# Patient Record
Sex: MALE | Race: WHITE | NOT HISPANIC OR LATINO | Employment: FULL TIME | URBAN - METROPOLITAN AREA
[De-identification: names, ages, dates, MRNs, and addresses within clinical notes are randomized per-mention and may not be internally consistent; named-entity substitution may affect disease eponyms.]

---

## 2021-06-01 ENCOUNTER — HOSPITAL ENCOUNTER (INPATIENT)
Facility: CLINIC | Age: 21
LOS: 2 days | Discharge: HOME | DRG: 897 | End: 2021-06-03
Attending: EMERGENCY MEDICINE | Admitting: INTERNAL MEDICINE
Payer: COMMERCIAL

## 2021-06-01 DIAGNOSIS — S06.9X0A TRAUMATIC BRAIN INJURY, WITHOUT LOSS OF CONSCIOUSNESS, INITIAL ENCOUNTER (H): Primary | ICD-10-CM

## 2021-06-01 DIAGNOSIS — F10.930 ALCOHOL WITHDRAWAL, UNCOMPLICATED (H): ICD-10-CM

## 2021-06-01 DIAGNOSIS — E87.29 ALCOHOLIC KETOACIDOSIS: ICD-10-CM

## 2021-06-01 PROBLEM — R56.9 ALCOHOL WITHDRAWAL SEIZURE (H): Status: ACTIVE | Noted: 2021-06-01

## 2021-06-01 PROBLEM — F10.939 ALCOHOL WITHDRAWAL SEIZURE (H): Status: ACTIVE | Noted: 2021-06-01

## 2021-06-01 LAB
ALBUMIN SERPL-MCNC: 4.2 G/DL (ref 3.4–5)
ALBUMIN UR-MCNC: 70 MG/DL
ALP SERPL-CCNC: 88 U/L (ref 40–150)
ALT SERPL W P-5'-P-CCNC: 49 U/L (ref 0–70)
AMPHETAMINES UR QL SCN: NEGATIVE
ANION GAP SERPL CALCULATED.3IONS-SCNC: 18 MMOL/L (ref 3–14)
APPEARANCE UR: ABNORMAL
AST SERPL W P-5'-P-CCNC: 42 U/L (ref 0–45)
BACTERIA #/AREA URNS HPF: ABNORMAL /HPF
BARBITURATES UR QL: NEGATIVE
BASOPHILS # BLD AUTO: 0 10E9/L (ref 0–0.2)
BASOPHILS NFR BLD AUTO: 0.3 %
BENZODIAZ UR QL: POSITIVE
BILIRUB SERPL-MCNC: 1.4 MG/DL (ref 0.2–1.3)
BILIRUB UR QL STRIP: NEGATIVE
BUN SERPL-MCNC: 17 MG/DL (ref 7–30)
CALCIUM SERPL-MCNC: 9.8 MG/DL (ref 8.5–10.1)
CANNABINOIDS UR QL SCN: POSITIVE
CHLORIDE SERPL-SCNC: 101 MMOL/L (ref 94–109)
CO2 SERPL-SCNC: 16 MMOL/L (ref 20–32)
COCAINE UR QL: NEGATIVE
COLOR UR AUTO: YELLOW
CREAT SERPL-MCNC: 1.38 MG/DL (ref 0.66–1.25)
DIFFERENTIAL METHOD BLD: ABNORMAL
EOSINOPHIL # BLD AUTO: 0 10E9/L (ref 0–0.7)
EOSINOPHIL NFR BLD AUTO: 0.1 %
ERYTHROCYTE [DISTWIDTH] IN BLOOD BY AUTOMATED COUNT: 13.5 % (ref 10–15)
ETHANOL SERPL-MCNC: <0.01 G/DL
GFR SERPL CREATININE-BSD FRML MDRD: 72 ML/MIN/{1.73_M2}
GLUCOSE SERPL-MCNC: 134 MG/DL (ref 70–99)
GLUCOSE UR STRIP-MCNC: NEGATIVE MG/DL
HCT VFR BLD AUTO: 48.3 % (ref 40–53)
HGB BLD-MCNC: 16.1 G/DL (ref 13.3–17.7)
HGB UR QL STRIP: NEGATIVE
HYALINE CASTS #/AREA URNS LPF: 16 /LPF (ref 0–2)
IMM GRANULOCYTES # BLD: 0 10E9/L (ref 0–0.4)
IMM GRANULOCYTES NFR BLD: 0.5 %
INTERPRETATION ECG - MUSE: NORMAL
KETONES BLD-SCNC: 1.7 MMOL/L (ref 0–0.6)
KETONES UR STRIP-MCNC: 20 MG/DL
LABORATORY COMMENT REPORT: NORMAL
LEUKOCYTE ESTERASE UR QL STRIP: NEGATIVE
LIPASE SERPL-CCNC: 70 U/L (ref 73–393)
LYMPHOCYTES # BLD AUTO: 0.7 10E9/L (ref 0.8–5.3)
LYMPHOCYTES NFR BLD AUTO: 7.4 %
MAGNESIUM SERPL-MCNC: 1.8 MG/DL (ref 1.6–2.3)
MAGNESIUM SERPL-MCNC: 1.9 MG/DL (ref 1.6–2.3)
MCH RBC QN AUTO: 31.7 PG (ref 26.5–33)
MCHC RBC AUTO-ENTMCNC: 33.3 G/DL (ref 31.5–36.5)
MCV RBC AUTO: 95 FL (ref 78–100)
MONOCYTES # BLD AUTO: 0.4 10E9/L (ref 0–1.3)
MONOCYTES NFR BLD AUTO: 5 %
MUCOUS THREADS #/AREA URNS LPF: PRESENT /LPF
NEUTROPHILS # BLD AUTO: 7.6 10E9/L (ref 1.6–8.3)
NEUTROPHILS NFR BLD AUTO: 86.7 %
NITRATE UR QL: NEGATIVE
NRBC # BLD AUTO: 0 10*3/UL
NRBC BLD AUTO-RTO: 0 /100
OPIATES UR QL SCN: NEGATIVE
PCP UR QL SCN: NEGATIVE
PH UR STRIP: 6.5 PH (ref 5–7)
PHOSPHATE SERPL-MCNC: 2.5 MG/DL (ref 2.5–4.5)
PLATELET # BLD AUTO: 261 10E9/L (ref 150–450)
POTASSIUM SERPL-SCNC: 3.5 MMOL/L (ref 3.4–5.3)
POTASSIUM SERPL-SCNC: 3.9 MMOL/L (ref 3.4–5.3)
PROT SERPL-MCNC: 8.5 G/DL (ref 6.8–8.8)
RBC # BLD AUTO: 5.08 10E12/L (ref 4.4–5.9)
RBC #/AREA URNS AUTO: 2 /HPF (ref 0–2)
SARS-COV-2 RNA RESP QL NAA+PROBE: NEGATIVE
SODIUM SERPL-SCNC: 135 MMOL/L (ref 133–144)
SOURCE: ABNORMAL
SP GR UR STRIP: 1.02 (ref 1–1.03)
SPECIMEN SOURCE: NORMAL
SQUAMOUS #/AREA URNS AUTO: 1 /HPF (ref 0–1)
UROBILINOGEN UR STRIP-MCNC: 0 MG/DL (ref 0–2)
WBC # BLD AUTO: 8.8 10E9/L (ref 4–11)
WBC #/AREA URNS AUTO: 3 /HPF (ref 0–5)

## 2021-06-01 PROCEDURE — 250N000011 HC RX IP 250 OP 636: Performed by: EMERGENCY MEDICINE

## 2021-06-01 PROCEDURE — 83735 ASSAY OF MAGNESIUM: CPT | Performed by: INTERNAL MEDICINE

## 2021-06-01 PROCEDURE — 258N000003 HC RX IP 258 OP 636: Performed by: EMERGENCY MEDICINE

## 2021-06-01 PROCEDURE — 80053 COMPREHEN METABOLIC PANEL: CPT | Performed by: EMERGENCY MEDICINE

## 2021-06-01 PROCEDURE — 82077 ASSAY SPEC XCP UR&BREATH IA: CPT | Performed by: EMERGENCY MEDICINE

## 2021-06-01 PROCEDURE — 83735 ASSAY OF MAGNESIUM: CPT | Performed by: EMERGENCY MEDICINE

## 2021-06-01 PROCEDURE — 250N000013 HC RX MED GY IP 250 OP 250 PS 637: Performed by: INTERNAL MEDICINE

## 2021-06-01 PROCEDURE — 99291 CRITICAL CARE FIRST HOUR: CPT | Mod: 25

## 2021-06-01 PROCEDURE — 81001 URINALYSIS AUTO W/SCOPE: CPT | Performed by: EMERGENCY MEDICINE

## 2021-06-01 PROCEDURE — 96361 HYDRATE IV INFUSION ADD-ON: CPT

## 2021-06-01 PROCEDURE — 96365 THER/PROPH/DIAG IV INF INIT: CPT

## 2021-06-01 PROCEDURE — 258N000001 HC RX 258: Performed by: INTERNAL MEDICINE

## 2021-06-01 PROCEDURE — 80307 DRUG TEST PRSMV CHEM ANLYZR: CPT | Performed by: EMERGENCY MEDICINE

## 2021-06-01 PROCEDURE — 84132 ASSAY OF SERUM POTASSIUM: CPT | Performed by: INTERNAL MEDICINE

## 2021-06-01 PROCEDURE — 120N000001 HC R&B MED SURG/OB

## 2021-06-01 PROCEDURE — 85025 COMPLETE CBC W/AUTO DIFF WBC: CPT | Performed by: EMERGENCY MEDICINE

## 2021-06-01 PROCEDURE — 83690 ASSAY OF LIPASE: CPT | Performed by: EMERGENCY MEDICINE

## 2021-06-01 PROCEDURE — 96375 TX/PRO/DX INJ NEW DRUG ADDON: CPT

## 2021-06-01 PROCEDURE — 84100 ASSAY OF PHOSPHORUS: CPT | Performed by: INTERNAL MEDICINE

## 2021-06-01 PROCEDURE — 99223 1ST HOSP IP/OBS HIGH 75: CPT | Mod: AI | Performed by: INTERNAL MEDICINE

## 2021-06-01 PROCEDURE — 87635 SARS-COV-2 COVID-19 AMP PRB: CPT | Performed by: EMERGENCY MEDICINE

## 2021-06-01 PROCEDURE — 82010 KETONE BODYS QUAN: CPT | Performed by: EMERGENCY MEDICINE

## 2021-06-01 PROCEDURE — 36415 COLL VENOUS BLD VENIPUNCTURE: CPT | Performed by: INTERNAL MEDICINE

## 2021-06-01 RX ORDER — AMOXICILLIN 250 MG
1 CAPSULE ORAL 2 TIMES DAILY PRN
Status: DISCONTINUED | OUTPATIENT
Start: 2021-06-01 | End: 2021-06-03 | Stop reason: HOSPADM

## 2021-06-01 RX ORDER — CLONIDINE HYDROCHLORIDE 0.1 MG/1
0.1 TABLET ORAL EVERY 8 HOURS
Status: DISCONTINUED | OUTPATIENT
Start: 2021-06-01 | End: 2021-06-03

## 2021-06-01 RX ORDER — GABAPENTIN 600 MG/1
1200 TABLET ORAL ONCE
Status: COMPLETED | OUTPATIENT
Start: 2021-06-01 | End: 2021-06-01

## 2021-06-01 RX ORDER — LORAZEPAM 2 MG/ML
1 INJECTION INTRAMUSCULAR EVERY 30 MIN PRN
Status: DISCONTINUED | OUTPATIENT
Start: 2021-06-01 | End: 2021-06-01

## 2021-06-01 RX ORDER — ONDANSETRON 2 MG/ML
4 INJECTION INTRAMUSCULAR; INTRAVENOUS EVERY 6 HOURS PRN
Status: DISCONTINUED | OUTPATIENT
Start: 2021-06-01 | End: 2021-06-03 | Stop reason: HOSPADM

## 2021-06-01 RX ORDER — ACETAMINOPHEN 325 MG/1
650 TABLET ORAL EVERY 4 HOURS PRN
Status: DISCONTINUED | OUTPATIENT
Start: 2021-06-01 | End: 2021-06-03 | Stop reason: HOSPADM

## 2021-06-01 RX ORDER — AMOXICILLIN 250 MG
2 CAPSULE ORAL 2 TIMES DAILY PRN
Status: DISCONTINUED | OUTPATIENT
Start: 2021-06-01 | End: 2021-06-03 | Stop reason: HOSPADM

## 2021-06-01 RX ORDER — FLUMAZENIL 0.1 MG/ML
0.2 INJECTION, SOLUTION INTRAVENOUS
Status: DISCONTINUED | OUTPATIENT
Start: 2021-06-01 | End: 2021-06-03 | Stop reason: HOSPADM

## 2021-06-01 RX ORDER — NALTREXONE HYDROCHLORIDE 50 MG/1
50 TABLET, FILM COATED ORAL DAILY
COMMUNITY

## 2021-06-01 RX ORDER — HALOPERIDOL 5 MG/ML
2.5-5 INJECTION INTRAMUSCULAR EVERY 6 HOURS PRN
Status: DISCONTINUED | OUTPATIENT
Start: 2021-06-01 | End: 2021-06-03 | Stop reason: HOSPADM

## 2021-06-01 RX ORDER — LANOLIN ALCOHOL/MO/W.PET/CERES
100 CREAM (GRAM) TOPICAL 3 TIMES DAILY
Status: DISCONTINUED | OUTPATIENT
Start: 2021-06-03 | End: 2021-06-03 | Stop reason: HOSPADM

## 2021-06-01 RX ORDER — MULTIPLE VITAMINS W/ MINERALS TAB 9MG-400MCG
1 TAB ORAL DAILY
Status: DISCONTINUED | OUTPATIENT
Start: 2021-06-01 | End: 2021-06-03 | Stop reason: HOSPADM

## 2021-06-01 RX ORDER — GABAPENTIN 300 MG/1
900 CAPSULE ORAL EVERY 8 HOURS
Status: DISCONTINUED | OUTPATIENT
Start: 2021-06-02 | End: 2021-06-03 | Stop reason: HOSPADM

## 2021-06-01 RX ORDER — LANOLIN ALCOHOL/MO/W.PET/CERES
100 CREAM (GRAM) TOPICAL DAILY
Status: DISCONTINUED | OUTPATIENT
Start: 2021-06-09 | End: 2021-06-03 | Stop reason: HOSPADM

## 2021-06-01 RX ORDER — GABAPENTIN 100 MG/1
100 CAPSULE ORAL EVERY 8 HOURS
Status: DISCONTINUED | OUTPATIENT
Start: 2021-06-09 | End: 2021-06-03 | Stop reason: HOSPADM

## 2021-06-01 RX ORDER — LORAZEPAM 2 MG/ML
2 INJECTION INTRAMUSCULAR ONCE
Status: COMPLETED | OUTPATIENT
Start: 2021-06-01 | End: 2021-06-01

## 2021-06-01 RX ORDER — LANOLIN ALCOHOL/MO/W.PET/CERES
200 CREAM (GRAM) TOPICAL 3 TIMES DAILY
Status: DISCONTINUED | OUTPATIENT
Start: 2021-06-01 | End: 2021-06-03 | Stop reason: HOSPADM

## 2021-06-01 RX ORDER — GABAPENTIN 300 MG/1
300 CAPSULE ORAL EVERY 8 HOURS
Status: DISCONTINUED | OUTPATIENT
Start: 2021-06-07 | End: 2021-06-03 | Stop reason: HOSPADM

## 2021-06-01 RX ORDER — GABAPENTIN 300 MG/1
600 CAPSULE ORAL EVERY 8 HOURS
Status: DISCONTINUED | OUTPATIENT
Start: 2021-06-05 | End: 2021-06-03 | Stop reason: HOSPADM

## 2021-06-01 RX ORDER — OLANZAPINE 5 MG/1
5-10 TABLET, ORALLY DISINTEGRATING ORAL EVERY 6 HOURS PRN
Status: DISCONTINUED | OUTPATIENT
Start: 2021-06-01 | End: 2021-06-03 | Stop reason: HOSPADM

## 2021-06-01 RX ORDER — DIAZEPAM 5 MG
10 TABLET ORAL EVERY 30 MIN PRN
Status: DISCONTINUED | OUTPATIENT
Start: 2021-06-01 | End: 2021-06-03 | Stop reason: HOSPADM

## 2021-06-01 RX ORDER — FOLIC ACID 1 MG/1
1 TABLET ORAL DAILY
Status: ON HOLD | COMMUNITY
End: 2021-06-03

## 2021-06-01 RX ORDER — FOLIC ACID 1 MG/1
1 TABLET ORAL DAILY
Status: DISCONTINUED | OUTPATIENT
Start: 2021-06-02 | End: 2021-06-03 | Stop reason: HOSPADM

## 2021-06-01 RX ORDER — ONDANSETRON 4 MG/1
4 TABLET, ORALLY DISINTEGRATING ORAL EVERY 6 HOURS PRN
Status: DISCONTINUED | OUTPATIENT
Start: 2021-06-01 | End: 2021-06-03 | Stop reason: HOSPADM

## 2021-06-01 RX ORDER — DIAZEPAM 10 MG/2ML
5-10 INJECTION, SOLUTION INTRAMUSCULAR; INTRAVENOUS EVERY 30 MIN PRN
Status: DISCONTINUED | OUTPATIENT
Start: 2021-06-01 | End: 2021-06-03 | Stop reason: HOSPADM

## 2021-06-01 RX ADMIN — MULTIPLE VITAMINS W/ MINERALS TAB 1 TABLET: TAB at 19:59

## 2021-06-01 RX ADMIN — DEXTROSE AND SODIUM CHLORIDE: 5; 900 INJECTION, SOLUTION INTRAVENOUS at 16:08

## 2021-06-01 RX ADMIN — THIAMINE HCL TAB 100 MG 200 MG: 100 TAB at 22:28

## 2021-06-01 RX ADMIN — LORAZEPAM 1 MG: 2 INJECTION INTRAMUSCULAR; INTRAVENOUS at 18:07

## 2021-06-01 RX ADMIN — DEXTROSE AND SODIUM CHLORIDE: 5; 450 INJECTION, SOLUTION INTRAVENOUS at 20:04

## 2021-06-01 RX ADMIN — GABAPENTIN 1200 MG: 600 TABLET, FILM COATED ORAL at 19:59

## 2021-06-01 RX ADMIN — LORAZEPAM 2 MG: 2 INJECTION INTRAMUSCULAR; INTRAVENOUS at 13:59

## 2021-06-01 RX ADMIN — SODIUM CHLORIDE 1000 ML: 9 INJECTION, SOLUTION INTRAVENOUS at 12:49

## 2021-06-01 RX ADMIN — LORAZEPAM 2 MG: 2 INJECTION INTRAMUSCULAR; INTRAVENOUS at 16:20

## 2021-06-01 RX ADMIN — CLONIDINE HYDROCHLORIDE 0.1 MG: 0.1 TABLET ORAL at 19:58

## 2021-06-01 ASSESSMENT — ENCOUNTER SYMPTOMS
AGITATION: 1
SEIZURES: 1
TREMORS: 1
BLOOD IN STOOL: 0
NERVOUS/ANXIOUS: 1

## 2021-06-01 ASSESSMENT — ACTIVITIES OF DAILY LIVING (ADL): ADLS_ACUITY_SCORE: 19

## 2021-06-01 NOTE — PHARMACY-ADMISSION MEDICATION HISTORY
Pharmacy Medication History  Admission medication history interview status for the 6/1/2021  admission is complete. See EPIC admission navigator for prior to admission medications     Location of Interview: Patient room  Medication history sources: Patient, Patient's family/friend (Mom), Surescripts and Care Everywhere    Significant changes made to the medication list:  Naltrexone and folic acid added.      Medication reconciliation completed by provider prior to medication history? No    Time spent in this activity: 10 minutes    Prior to Admission medications    Medication Sig Last Dose Taking? Auth Provider   folic acid (FOLVITE) 1 MG tablet Take 1 mg by mouth daily 6/1/2021 at am Yes Unknown, Entered By History   naltrexone (DEPADE/REVIA) 50 MG tablet Take 50 mg by mouth daily 6/1/2021 at am Yes Unknown, Entered By History       The information provided in this note is only as accurate as the sources available at the time of update(s)

## 2021-06-01 NOTE — H&P
[facility]    History and Physical  Hospitalist       Date of Admission:  6/1/2021    Assessment & Plan   Chandrakant Barone is a 20 year old male who presents with seizure presumed due to alcohol withdrawal.    Acute alcohol withdrawal  Alcohol withdrawal seizure  Alcoholic ketoacidosis  -Recent hospitalization at [facility] between 5/10/2021-5/15/2021 where he was admitted with severe alcohol withdrawal requiring Precedex drip and seizure thought to be due to withdrawal seizures.  -Was started on naltrexone at that time with plan for outpatient addiction medicine follow-up.  -Presents with another seizure at work, apparently started drinking after discharge  -Admit to telemetry  -Start CIWA protocol with symptom triggered diazepam along with Neurontin taper  -Replace electrolytes  -Thiamine, multivitamin  -Chemical dependency and psychiatry consult    Acute kidney injury   Alcoholic ketoacidosis  -Presents with creatinine of 1.38, likely dehydrated from decreased p.o. intake  -Continue D5 half-normal saline at 100 mL/h  -Recheck BMP in a.m.    Chandrakant is a LOW SUSPICION PUI.  Follow these instructions:    If COVID test positive -> continue isolation precautions    If COVID test negative -> discontinue COVID-specific isolation precautions     DVT Prophylaxis: Pneumatic Compression Devices  Code Status: Full Code    Disposition: Expected discharge in 2+ days    Miguelito Fuentes MD    Primary Care Physician   No primary care provider on file.    Chief Complaint   Seizure    History is obtained from the patient    History of Present Illness   Chandrakant Barone is a 20 year old male with history of  recent seizure suspected due to alcohol withdrawal who presents with another episode of seizure. The patient reportedly had another episode of generalized tonic-clonic seizure at work today.  Patient mentions that right before he had the seizure he felt a little lightheaded and fuzzy.   Denies any headache.  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 His coworkers told EMS that they witnessed 5 minutes of seizure-like activity followed by a five minute post ictal period.   His last drink was 2 days ago, apparently he had 3 drinks that day.  He had one beer yesterday afternoon to minimize tremors, as he did not want to be shaking in front of his parents. His reports that his last episode of vomiting was three days ago.  Patient mentioned that couple of days after discharge from last hospitalization he stayed sober however started drinking back again and has been drinking heavily this past week until Saturday.  The patient was hospitalized at [facility] between 5/10/2021-5/15/2021 for severe alcohol withdrawal and seizure thought to be due to alcohol withdrawal.  He had a CT head at that time that was unremarkable.  He required Precedex drip in the ICU for severe alcohol withdrawal at that time.  He was discharged on naltrexone with plan for outpatient addiction medicine follow-up.  He mentions that he has been taking his naltrexone consistently.  As far as other pertinent review of system is concerned, patient denies chest pain or dyspnea.  His appetite has been poor and he has not been eating regular food.  Denies abdominal pain.  Denies dysuria urinary urgency or frequency.  No hematochezia or melena or bleeding from any source.  In the emergency room the patient was seen by Dr. Aponte, he was found to be in acute alcohol withdrawal, acute kidney injury with alcoholic ketoacidosis.  He was given lorazepam, started on IV fluids.    Past Medical History    I have reviewed this patient's medical history and updated it with pertinent information if needed.   Alcohol withdrawal seizure.  ? Essential tremor    Past Surgical History   I have reviewed this patient's surgical history and updated it with pertinent information if needed.  No past surgical history on file.    Prior to Admission Medications   None      Allergies   Not on File    Social History   Alcohol history as mentioned above, smokes marijuana    Family History   I have reviewed this patient's family history and updated it with pertinent information if needed.     No family history on file.    Review of Systems   The 10 point Review of Systems is negative other than noted in the HPI or here.     Physical Exam   Temp: 98.5  F (36.9  C) Temp src: Oral BP: (!) 138/96 Pulse: 80   Resp: 15 SpO2: 98 % O2 Device: None (Room air)    Vital Signs with Ranges  Temp:  [98.5  F (36.9  C)] 98.5  F (36.9  C)  Pulse:  [] 80  Resp:  [10-26] 15  BP: (112-138)/(68-96) 138/96  SpO2:  [95 %-98 %] 98 %  0 lbs 0 oz    Gen: AAOX3, NAD, anxious  HEENT: Moist oral mucosa, no pallor or icterus  Neck: Supple neck, good ROM, no stridor  Resp: CTA B/L, normal WOB; no crackles; no wheezes  CVS-tachycardic, regular  Abd/GI: Soft, non-tender. BS- normoactive  Skin- Warm, dry, no rashes  MSK: No joint deformity; no edema  Neuro- CN- intact. Moving X 4; upper extremity and tongue tremors    Data   Data reviewed today:  I personally reviewed the EKG tracing showing Normal sinus rhythm.    Recent Labs   Lab 06/01/21  1152   WBC 8.8   HGB 16.1   MCV 95         POTASSIUM 3.9   CHLORIDE 101   CO2 16*   BUN 17   CR 1.38*   ANIONGAP 18*   JOSE 9.8   *   ALBUMIN 4.2   PROTTOTAL 8.5   BILITOTAL 1.4*   ALKPHOS 88   ALT 49   AST 42   LIPASE 70*       No results found for this or any previous visit (from the past 24 hour(s)).

## 2021-06-01 NOTE — ED TRIAGE NOTES
Pt presents to the ED via EMS for evaluation following seizure. Per EMS, pt at job site today per coworkers 5 mins of seizure like activity followed by 5 mins of a post ictal period. Per EMS, pt had first seizure on 05/10/2021 and was hospitalized at [facility]. Per [facility] record seziure from alcohol withdrawal. Pt denied alcohol use for EMS. Blood sugar for EMS: 148.

## 2021-06-01 NOTE — ED NOTES
"Pt reports to RN, drinks daily but \"trying to cut back.\" Pt reports, \"that was only part of the problem with my last seizure.\" Pt states last drink two days ago. Pt reports he drinks a few shots and some drinks each day.   "

## 2021-06-01 NOTE — ED NOTES
[facility]  ED Nurse Handoff Report    ED Chief complaint: Seizures      ED Diagnosis:   Final diagnoses:   Alcoholic ketoacidosis   Alcohol withdrawal, uncomplicated (H)       Code Status: Full Code    Allergies: Not on File    Patient Story: Pt presents after experiencing a seizure at work today. Seizure lasted about 5 mins followed by a post ictal phase. Pt also experiencing symptoms of ETOH withdrawal such as tremors. Pt alert and orientated, calm and cooperative.  Of note pt had similar seizure on 5/10/21 that led to a hospitalization at [facility].   Focused Assessment:  Tremulous but no other signs if withdrawal.   Abnormal Labs Reviewed   CBC WITH PLATELETS DIFFERENTIAL - Abnormal; Notable for the following components:       Result Value    Absolute Lymphocytes 0.7 (*)     All other components within normal limits   COMPREHENSIVE METABOLIC PANEL - Abnormal; Notable for the following components:    Carbon Dioxide 16 (*)     Anion Gap 18 (*)     Glucose 134 (*)     Creatinine 1.38 (*)     Bilirubin Total 1.4 (*)     All other components within normal limits   KETONE BETA-HYDROXYBUTYRATE QUANTITATIVE - Abnormal; Notable for the following components:    Ketone Quantitative 1.7 (*)     All other components within normal limits   ROUTINE UA WITH MICROSCOPIC - Abnormal; Notable for the following components:    Ketones Urine 20 (*)     Protein Albumin Urine 70 (*)     Bacteria Urine Few (*)     Mucous Urine Present (*)     Hyaline Casts 16 (*)     All other components within normal limits   DRUG ABUSE SCREEN 77 URINE (FL, RH, SH) - Abnormal; Notable for the following components:    Benzodiazepine Qual Urine Positive (*)     Cannabinoids Qual Urine Positive (*)     All other components within normal limits   LIPASE - Abnormal; Notable for the following components:    Lipase 70 (*)     All other components within normal limits       Treatments and/or interventions provided: 0.9% NS IVF, Ativan, D5% 09%  NS  Patient's response to treatments and/or interventions: Reduction in tremors and heart rate    To be done/followed up on inpatient unit:  See inpatient orders    Does this patient have any cognitive concerns?: A&O    Activity level - Baseline/Home:  Independent  Activity Level - Current:   Stand with Assist    Patient's Preferred language: English   Needed?: No    Isolation: None  Infection: Not Applicable  Patient tested for COVID 19 prior to admission: YES  Bariatric?: No    Vital Signs:   Vitals:    06/01/21 1730 06/01/21 1745 06/01/21 1800 06/01/21 1815   BP: (!) 136/120 (!) 138/91 (!) 156/146 (!) 144/90   Pulse: 88 95 122 111   Resp: 30 15 26    Temp:       TempSrc:       SpO2:    98%       Cardiac Rhythm:Cardiac Rhythm: Normal sinus rhythm    Was the PSS-3 completed:   Yes  What interventions are required if any?               Family Comments: Mother at bedside   OBS brochure/video discussed/provided to patient/family: N/A                For the majority of the shift this patient's behavior was Green.   Behavioral interventions performed were None.    ED NURSE PHONE NUMBER: [phone]

## 2021-06-01 NOTE — ED PROVIDER NOTES
History   Chief Complaint:  Seizures       HPI   Chandrakant Barone is a 20 year old male with history of seizure and alcohol use disorder who presents with seizure. The patient reports that he experienced a seizure while at work today. 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 His coworkers told EMS that they witnessed 5 minutes of seizure-like activity followed by a five minute post ictal period. Here in the ED, the patient believes the seizure was related to alcohol withdrawal. He last drank heavily two days ago. He had one beer yesterday afternoon to minimize tremors, as he did not want to be shaking in front of his parents. His reports that his last episode of vomiting was three days ago.     The patient had another recent seizure on 5/10/2021 and was hospitalized at [facility]. Medical staff at [facility] thought the seizure was related to heavy alcohol use. Once admitted to [facility], the patient reports that he started experiencing severe alcohol withdrawal symptoms. He was never placed on a ventilator during this visit. He says that he has drank alcohol every other day for the last five years. Gin is his usual drink of choice. The patient denies history of epilepsy, use of street drugs, hematemesis, or black/bloody stools. He occasionally uses marijuana.     Review of Systems   Gastrointestinal: Negative for blood in stool.   Neurological: Positive for tremors and seizures.   Psychiatric/Behavioral: Positive for agitation. The patient is nervous/anxious.    All other systems reviewed and are negative.      Allergies:  No known medication allergies    Medications:  Revia  Folic acid    Past Medical History:    Seizure  Alcoholic ketoacidosis  Major depressive disorder  ASHIA  Alcohol use disorder  Concussion  Migraine  Mallet finger    Past Surgical History:    No known surgical history     Family History:    Alcohol abuse, father  Alcohol abuse, mother  Migraines, mother  ADHD, brother  Asthma, brother    Social  History:  Accompanied by mother in ED  Arrives via EMS  He lives with his parents.    Physical Exam     Patient Vitals for the past 24 hrs:   BP Temp Temp src Pulse Resp SpO2   06/01/21 1500 (!) 138/96 -- -- 80 15 98 %   06/01/21 1445 (!) 130/90 -- -- 80 16 96 %   06/01/21 1430 137/81 -- -- 96 16 97 %   06/01/21 1415 136/80 -- -- 86 20 97 %   06/01/21 1400 118/68 -- -- 80 16 98 %   06/01/21 1345 (!) 123/90 -- -- 92 21 97 %   06/01/21 1330 127/82 -- -- 71 13 97 %   06/01/21 1315 133/79 -- -- 74 18 98 %   06/01/21 1300 129/81 -- -- 74 11 97 %   06/01/21 1249 -- -- -- 85 12 96 %   06/01/21 1248 -- -- -- 79 11 97 %   06/01/21 1247 -- -- -- 82 13 97 %   06/01/21 1246 -- -- -- 96 18 --   06/01/21 1245 112/82 -- -- 86 12 97 %   06/01/21 1230 136/83 -- -- 97 15 95 %   06/01/21 1217 -- -- -- 96 18 96 %   06/01/21 1216 -- -- -- 94 10 96 %   06/01/21 1215 135/79 -- -- 98 16 95 %   06/01/21 1151 -- 98.5  F (36.9  C) Oral 110 20 96 %   06/01/21 1146 -- -- -- 105 26 96 %   06/01/21 1143 (!) 135/93 -- -- 108 14 96 %       Physical Exam     General: Resting uncomfortably on the gurney, anxious and shaking.   Head:  The scalp, face, and head appear normal  Eyes:  The pupils are equal, round, and reactive to light    There is no nystagmus    Extraocular muscles are intact    Conjunctivae and sclerae are normal  ENT:    The nose is normal    Pinnae are normal    The oropharynx is normal accept moderate fibrillation of the tongue.    Uvula is in the midline  Neck:  Normal range of motion    There is no rigidity noted    There is no midline cervical spine pain/tenderness    Trachea is in the midline    No mass is detected  CV:  Regular rate and underlying rhythm     Normal S1/S2, no S3/S4    No pathological murmur detected  Resp:  Lungs are clear    There is no tachypnea    Non-labored    No rales    No wheezing   GI:  Abdomen is soft, there is no rigidity    No distension    No tympani    No rebound tenderness     Non-surgical without  peritoneal features  MS:  Normal muscular tone    Symmetric motor strength    No major joint effusions    No asymmetric leg swelling, no calf tenderness  Skin:  No rash or acute skin lesions noted  Neuro: Speech is normal and fluent. There is moderate tremor to the outstretched hands.   Psych:  Awake. Alert.      Normal affect.  Appropriate interactions.  Lymph: No anterior cervical lymphadenopathy noted    Emergency Department Course     Laboratory:    CBC: WBC 8.8, HGB 16.1,     CMP: carbon dioxide 16 (L), anion gap 18 (H), glucose 134 (H), bilirubin 1.4 (H) o/w WNL (Creatinine 1.38 ((H))      Alcohol ethyl: <0.01    Lipase: 70 (L)    Magnesium: In process    Drug abuse screen 77 urine: benzodiazepine positive (A), cannabinoids positive (A) o/w all negative      UA with microscopic: urineketon 20 (A), albumin 70 (A), bacteria few (A), mucous present (A), hyaline casts 16 (H) o/w WNL     Ketone 1.7 (H!!)    Emergency Department Course:    Reviewed:  I reviewed nursing notes, vitals, past medical history and care everywhere    Assessments:  1355 I obtained history and examined the patient as noted above.   1601 I rechecked the patient and explained findings.  The patient still has significant hand tremor and tongue fibrillation.    Interventions:  1249 Normal Saline 1000 mL IV  1359 Ativan 2 mg IV    Disposition:  The patient was admitted to the hospital under the care of the hospitalist.      Impression & Plan       Medical Decision Making:  Patient presents after experiencing an alcohol withdrawal seizure at work today.  He had 1 2 weeks ago is well and was admitted to [facility] as noted above.  He was in the hospital for a number of days.  He did develop delirium during that episode and required soft restraints and sedation.  The patient and his mom are hoping for additional treatment options.  The patient's last drink was 2 days ago.  He was working today and had a witnessed generalized  tonic-clonic seizure.  The patient has not really had any desire to eat for the last few days and therefore has alcoholic induced ketoacidosis at this time.  He has a slight increase in his anion gap and ketones are positive in the blood and urine.  Patient had hydration initially commenced with sodium chloride crystalloid then he was switched over to D5 normal saline.  The patient has required 4 mg of intravenous lorazepam to date.  Given the gravity of the episode of withdrawal and delirium prior, with a very similar presentation, I believe that admission to the hospital is indicated.  He is asymptomatic from a Covid standpoint.  The patient and his mom will be looking for outpatient resources at the end of this visit for alcoholism.    Placed some additional doses of lorazepam into the order section of the chart as the patient will be waiting for an inpatient bed.  I have signed the patient out at this juncture to Dr. Ailyn Caldera while the patient boards in the emergency department.      Covid-19  Chandrakant Barone was evaluated during a global COVID-19 pandemic, which necessitated consideration that the patient might be at risk for infection with the SARS-CoV-2 virus that causes COVID-19.   Applicable protocols for evaluation were followed during the patient's care.   COVID-19 was considered as part of the patient's evaluation. The plan for testing is:  a test was obtained during this visit.    Diagnosis:    ICD-10-CM    1. Alcoholic ketoacidosis  E87.2    2. Alcohol withdrawal, uncomplicated (H)  F10.230          Scribe Disclosure:  Fortino CHU, am serving as a scribe at 1:55 PM on 6/1/2021 to document services personally performed by Raj Aponte MD based on my observations and the provider's statements to me.              Raj Aponte MD  06/01/21 4561       Raj Aponte MD  06/01/21 5587

## 2021-06-01 NOTE — ED NOTES
Bed: ED16  Expected date:   Expected time:   Means of arrival:   Comments:  [facility] 20 [facility] seizure eta 1138

## 2021-06-01 NOTE — PROGRESS NOTES
RECEIVING UNIT ED HANDOFF REVIEW    ED Nurse Handoff Report was reviewed by: Vernell Petersen RN on June 1, 2021 at 6:53 PM

## 2021-06-02 LAB
ALBUMIN SERPL-MCNC: 3.4 G/DL (ref 3.4–5)
ALP SERPL-CCNC: 69 U/L (ref 40–150)
ALT SERPL W P-5'-P-CCNC: 37 U/L (ref 0–70)
ANION GAP SERPL CALCULATED.3IONS-SCNC: 7 MMOL/L (ref 3–14)
AST SERPL W P-5'-P-CCNC: 32 U/L (ref 0–45)
BILIRUB SERPL-MCNC: 1 MG/DL (ref 0.2–1.3)
BUN SERPL-MCNC: 8 MG/DL (ref 7–30)
CALCIUM SERPL-MCNC: 8.5 MG/DL (ref 8.5–10.1)
CHLORIDE SERPL-SCNC: 107 MMOL/L (ref 94–109)
CO2 SERPL-SCNC: 23 MMOL/L (ref 20–32)
CREAT SERPL-MCNC: 0.85 MG/DL (ref 0.66–1.25)
ERYTHROCYTE [DISTWIDTH] IN BLOOD BY AUTOMATED COUNT: 14 % (ref 10–15)
GFR SERPL CREATININE-BSD FRML MDRD: >90 ML/MIN/{1.73_M2}
GLUCOSE SERPL-MCNC: 88 MG/DL (ref 70–99)
HCT VFR BLD AUTO: 41.8 % (ref 40–53)
HGB BLD-MCNC: 13.9 G/DL (ref 13.3–17.7)
MCH RBC QN AUTO: 32.2 PG (ref 26.5–33)
MCHC RBC AUTO-ENTMCNC: 33.3 G/DL (ref 31.5–36.5)
MCV RBC AUTO: 97 FL (ref 78–100)
PLATELET # BLD AUTO: 198 10E9/L (ref 150–450)
POTASSIUM SERPL-SCNC: 3.3 MMOL/L (ref 3.4–5.3)
POTASSIUM SERPL-SCNC: 4.2 MMOL/L (ref 3.4–5.3)
PROT SERPL-MCNC: 6.7 G/DL (ref 6.8–8.8)
RBC # BLD AUTO: 4.32 10E12/L (ref 4.4–5.9)
SODIUM SERPL-SCNC: 137 MMOL/L (ref 133–144)
WBC # BLD AUTO: 5.5 10E9/L (ref 4–11)

## 2021-06-02 PROCEDURE — 36415 COLL VENOUS BLD VENIPUNCTURE: CPT | Performed by: INTERNAL MEDICINE

## 2021-06-02 PROCEDURE — 120N000001 HC R&B MED SURG/OB

## 2021-06-02 PROCEDURE — H0001 ALCOHOL AND/OR DRUG ASSESS: HCPCS

## 2021-06-02 PROCEDURE — 99207 PR CONSULT E&M CHANGED TO INITIAL LEVEL: CPT | Performed by: PSYCHIATRY & NEUROLOGY

## 2021-06-02 PROCEDURE — 84132 ASSAY OF SERUM POTASSIUM: CPT | Performed by: INTERNAL MEDICINE

## 2021-06-02 PROCEDURE — 250N000013 HC RX MED GY IP 250 OP 250 PS 637: Performed by: INTERNAL MEDICINE

## 2021-06-02 PROCEDURE — 99233 SBSQ HOSP IP/OBS HIGH 50: CPT | Performed by: INTERNAL MEDICINE

## 2021-06-02 PROCEDURE — 258N000001 HC RX 258: Performed by: INTERNAL MEDICINE

## 2021-06-02 PROCEDURE — 85027 COMPLETE CBC AUTOMATED: CPT | Performed by: INTERNAL MEDICINE

## 2021-06-02 PROCEDURE — 99223 1ST HOSP IP/OBS HIGH 75: CPT | Performed by: PSYCHIATRY & NEUROLOGY

## 2021-06-02 PROCEDURE — 80053 COMPREHEN METABOLIC PANEL: CPT | Performed by: INTERNAL MEDICINE

## 2021-06-02 RX ORDER — NICOTINE 21 MG/24HR
1 PATCH, TRANSDERMAL 24 HOURS TRANSDERMAL DAILY
Status: DISCONTINUED | OUTPATIENT
Start: 2021-06-02 | End: 2021-06-03 | Stop reason: HOSPADM

## 2021-06-02 RX ORDER — DEXTROSE MONOHYDRATE, SODIUM CHLORIDE, SODIUM LACTATE, POTASSIUM CHLORIDE, CALCIUM CHLORIDE 5; 600; 310; 179; 20 G/100ML; MG/100ML; MG/100ML; MG/100ML; MG/100ML
INJECTION, SOLUTION INTRAVENOUS CONTINUOUS
Status: DISCONTINUED | OUTPATIENT
Start: 2021-06-02 | End: 2021-06-03

## 2021-06-02 RX ORDER — POTASSIUM CHLORIDE 1500 MG/1
40 TABLET, EXTENDED RELEASE ORAL ONCE
Status: COMPLETED | OUTPATIENT
Start: 2021-06-02 | End: 2021-06-02

## 2021-06-02 RX ADMIN — THIAMINE HCL TAB 100 MG 200 MG: 100 TAB at 21:53

## 2021-06-02 RX ADMIN — DIAZEPAM 10 MG: 5 TABLET ORAL at 10:05

## 2021-06-02 RX ADMIN — MULTIPLE VITAMINS W/ MINERALS TAB 1 TABLET: TAB at 09:37

## 2021-06-02 RX ADMIN — POTASSIUM CHLORIDE 40 MEQ: 1500 TABLET, EXTENDED RELEASE ORAL at 11:03

## 2021-06-02 RX ADMIN — CLONIDINE HYDROCHLORIDE 0.1 MG: 0.1 TABLET ORAL at 04:02

## 2021-06-02 RX ADMIN — GABAPENTIN 900 MG: 300 CAPSULE ORAL at 21:54

## 2021-06-02 RX ADMIN — CLONIDINE HYDROCHLORIDE 0.1 MG: 0.1 TABLET ORAL at 21:54

## 2021-06-02 RX ADMIN — CLONIDINE HYDROCHLORIDE 0.1 MG: 0.1 TABLET ORAL at 12:49

## 2021-06-02 RX ADMIN — GABAPENTIN 900 MG: 300 CAPSULE ORAL at 04:01

## 2021-06-02 RX ADMIN — NICOTINE 1 PATCH: 14 PATCH, EXTENDED RELEASE TRANSDERMAL at 12:49

## 2021-06-02 RX ADMIN — GABAPENTIN 900 MG: 300 CAPSULE ORAL at 12:48

## 2021-06-02 RX ADMIN — DEXTROSE MONOHYDRATE, SODIUM CHLORIDE, SODIUM LACTATE, POTASSIUM CHLORIDE, CALCIUM CHLORIDE: 5; 600; 310; 179; 20 INJECTION, SOLUTION INTRAVENOUS at 08:34

## 2021-06-02 RX ADMIN — DEXTROSE AND SODIUM CHLORIDE: 5; 450 INJECTION, SOLUTION INTRAVENOUS at 04:02

## 2021-06-02 RX ADMIN — DEXTROSE MONOHYDRATE, SODIUM CHLORIDE, SODIUM LACTATE, POTASSIUM CHLORIDE, CALCIUM CHLORIDE: 5; 600; 310; 179; 20 INJECTION, SOLUTION INTRAVENOUS at 21:48

## 2021-06-02 RX ADMIN — THIAMINE HCL TAB 100 MG 200 MG: 100 TAB at 18:27

## 2021-06-02 RX ADMIN — FOLIC ACID 1 MG: 1 TABLET ORAL at 09:37

## 2021-06-02 RX ADMIN — NICOTINE 1 PATCH: 14 PATCH, EXTENDED RELEASE TRANSDERMAL at 21:52

## 2021-06-02 RX ADMIN — DIAZEPAM 10 MG: 5 TABLET ORAL at 08:35

## 2021-06-02 RX ADMIN — THIAMINE HCL TAB 100 MG 200 MG: 100 TAB at 09:38

## 2021-06-02 ASSESSMENT — ACTIVITIES OF DAILY LIVING (ADL)
ADLS_ACUITY_SCORE: 16

## 2021-06-02 ASSESSMENT — MIFFLIN-ST. JEOR: SCORE: 2008.69

## 2021-06-02 NOTE — PROGRESS NOTES
[facility]    Hospitalist Progress Note    Assessment & Plan   Chandrakant Barone is a 20 year old male who presents with seizure presumed due to alcohol withdrawal.     Acute alcohol withdrawal  Alcohol withdrawal seizure  Alcoholic ketoacidosis  Alcohol use disorder- severe    -Recent hospitalization at [facility] between 5/10/2021-5/15/2021 where he was admitted with severe alcohol withdrawal requiring Precedex drip and seizure thought to be due to withdrawal seizures.  -no hx of treatment. No hx of attending sober support group meetings.   -Was started on naltrexone at that time with plan for outpatient addiction medicine follow-up.  -Presents with another seizure at work, apparently started drinking after discharge  -CIEW 7-11 today. Tremor, anxiety, orientation  -currently feeling better. No new issues. Less tremulous. Taking po  Lives with mother who is at bedside.   -plans on outpatient chem dep- has call with [facility] tomorrow  - hx of 4 TBIs in highschool with no neurologist eval    Plan;   -Admit to telemetry  -Start CIWA protocol with symptom triggered diazepam along with Neurontin taper  -Replace electrolytes  -Thiamine, multivitamin  -Chemical dependency consulted and seen, referral to [facility], pt interested in outpatient chem dep.   -psychiatry consulted and seen. Hold if attempt to leave in next 24 hours per psych.   - abstain from etoh/mood altering substances.   -outpatient chem dep  -neurotin, ciwa protocol, mv, thiamine, folate per etoh w/d protocol     Acute kidney injury -resolved.   Alcoholic ketoacidosis  -Presents with creatinine of 1.38, likely dehydrated from decreased p.o. intake  -Continue D5 half-normal saline at 100 mL/h---> change to LR.   -Recheck BMP in a.m.    Hx of TBI  4 TBIs in highschool related to sports  Will place referral to [facility] of neurology at discharge  pcp follow up [facility] system next week. Discussed with pt and his mother.      Tobacco use  Nicotine patch     Hypokalemia  Replace per protocol. Check Mg level  Am bmp      Chandrakant is a LOW SUSPICION PUI. covid negative.          DVT Prophylaxis: Pneumatic Compression Devices  Code Status: Full Code     Disposition: Expected discharge in 1-2 days. Lives with mother    Needs pcp follow up    Moe Escobedo MD  Text Page  (7am to 6pm)  Interval History   Feeling better this afternoon. Less tremulous    -Data reviewed today: I reviewed all new labs and imaging results over the last 24 hours. I personally reviewed labs and since admission    Physical Exam   Temp: 97.8  F (36.6  C) Temp src: Oral BP: 119/79 Pulse: 72   Resp: 16 SpO2: 98 % O2 Device: None (Room air)    Vitals:    06/02/21 0728   Weight: 90.7 kg (200 lb)     Vital Signs with Ranges  Temp:  [97.8  F (36.6  C)-98.6  F (37  C)] 97.8  F (36.6  C)  Pulse:  [] 72  Resp:  [7-30] 16  BP: (109-156)/() 119/79  SpO2:  [96 %-99 %] 98 %  I/O last 3 completed shifts:  In: 1000 [IV Piggyback:1000]  Out: 350 [Urine:350]    Constitutional: Nad, in bed  Respiratory: CTAB  Cardiovascular: RRR no r/g/m  GI: soft, nt, nd  Skin/Integumen: no rash or edema  Psych: pleasant, cooperative  Neuro: CN 2-12 grossly intact, oriented, nl finger nose finger. Strength 5/5 extrem X 4. Eomi. Perrla, nl speech and mentation     Medications     dextrose 5% lactated ringers + KCl 20 mEq 100 mL/hr at 06/02/21 0834       cloNIDine  0.1 mg Oral Q8H     folic acid  1 mg Oral Daily     [START ON 6/9/2021] gabapentin  100 mg Oral Q8H     [START ON 6/7/2021] gabapentin  300 mg Oral Q8H     [START ON 6/5/2021] gabapentin  600 mg Oral Q8H     gabapentin  900 mg Oral Q8H     multivitamin w/minerals  1 tablet Oral Daily     nicotine  1 patch Transdermal Daily     nicotine   Transdermal Q8H     thiamine  200 mg Oral TID    Followed by     [START ON 6/3/2021] thiamine  100 mg Oral TID    Followed by     [START ON 6/9/2021] thiamine  100 mg Oral Daily       Data    Recent Labs   Lab 06/02/21  0806 06/01/21 2031 06/01/21  1152   WBC 5.5  --  8.8   HGB 13.9  --  16.1   MCV 97  --  95     --  261     --  135   POTASSIUM 3.3* 3.5 3.9   CHLORIDE 107  --  101   CO2 23  --  16*   BUN 8  --  17   CR 0.85  --  1.38*   ANIONGAP 7  --  18*   JOSE 8.5  --  9.8   GLC 88  --  134*   ALBUMIN 3.4  --  4.2   PROTTOTAL 6.7*  --  8.5   BILITOTAL 1.0  --  1.4*   ALKPHOS 69  --  88   ALT 37  --  49   AST 32  --  42   LIPASE  --   --  70*       Imaging:   No results found for this or any previous visit (from the past 24 hour(s)).

## 2021-06-02 NOTE — PROGRESS NOTES
2021      Type Of Assessment: Inpatient Substance Use Comprehensive Assessment    Referral Source:  [facility]  MRN: [id number]    DATE OF SERVICE: 2021  Date of previous RASHI Assessment: NA  Patient confirmed identity through two factor verification: Full Legal Name,  and SSN    PATIENT'S NAME: Chandrakant Barone  Age: 20 year old  Last 4 SSN: [id number]  Sex: male   Gender Identity: male  Sexual Orientation: Heterosexual  Cultural Background: No, Denies any cultural influences or concerns that need to be considered for treatment  YOB: 2000  Current Address:   [facility] [address]  Patient Phone Number:  [phone]  Patient's E-Mail Contact:  [email or web address]  Funding: [facility]        Telemedicine Visit: The patient's condition can be safely assessed and treated via synchronous audio and visual telemedicine encounter.    Reason for Telemedicine Visit: Services only offered telehealth  Originating Site (Patient Location): [address] [place] [address]     Distant Site (Provider Location): Provider Remote Setting  Consent:  The patient/guardian has verbally consented to: the potential risks and benefits of telemedicine (video visit) versus in person care; bill my insurance or make self-payment for services provided; and responsibility for payment of non-covered services.   Mode of Communication:  Video Conference via [employer]    START TIME: 1015 am  END TIME: 1046 am    As the provider I attest to compliance with applicable laws and regulations related to telemedicine.      Chandrakant Barone was seen for a substance use disorder consult on 2021 by TIFFANIE Maynard.      Reason for Substance Use Disorder Consult:  Per H&P    Chief Complaint     Seizure     History is obtained from the patient     History of Present Illness     Chandrakant Barone is a 20 year old male with history of  recent seizure suspected due to  alcohol withdrawal who presents with another episode of seizure. The patient reportedly had another episode of generalized tonic-clonic seizure at work today.  Patient mentions that right before he had the seizure he felt a little lightheaded and fuzzy.  Denies any headache.  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 His coworkers told EMS that they witnessed 5 minutes of seizure-like activity followed by a five minute post ictal period.   His last drink was 2 days ago, apparently he had 3 drinks that day.  He had one beer yesterday afternoon to minimize tremors, as he did not want to be shaking in front of his parents. His reports that his last episode of vomiting was three days ago.  Patient mentioned that couple of days after discharge from last hospitalization he stayed sober however started drinking back again and has been drinking heavily this past week until Saturday.  The patient was hospitalized at [facility] between 5/10/2021-5/15/2021 for severe alcohol withdrawal and seizure thought to be due to alcohol withdrawal.  He had a CT head at that time that was unremarkable.  He required Precedex drip in the ICU for severe alcohol withdrawal at that time.  He was discharged on naltrexone with plan for outpatient addiction medicine follow-up.  He mentions that he has been taking his naltrexone consistently.    Are you currently having severe withdrawal symptoms that are putting yourself or others in danger? No, pt is currently hospitalized.  Are you currently having severe medical problems that require immediate attention? No, pt is currently hospitalized.  Are you currently having severe emotional or behavioral problems that are putting yourself or others at risk of harm? No, pt is currently hospitalized.    Have you participated in prior substance use disorder evaluations? No   Have you ever been to detox, inpatient or outpatient treatment for substance related use? List previous treatment: No   Have you ever  had a gambling problem or had treatment for compulsive gambling? No    Patient does not appear to be in severe withdrawal, an imminent safety risk to self or others, or requiring immediate medical attention and may proceed with the assessment interview.    Comprehensive Substance Use History   X X = Primary Drug Used Age of First Use    Pattern of Substance Use   (heaviest use in life and a use history within the past year if applicable) (DSM-5: Sx #3) Date /  Quantity of last use if within the past 30 days Withdrawal Potential?   Method of use  (Oral, smoked, snorted, IV, etc)    Alcohol   14 Every other weekend with friends if they had a bottle as a teen.  Senior year every weekend Daily for last year shots of gin throughout the day. 1.75L bottle lasts about 2 days  Yes Oral    Marijuana/Hashish   15 17-20 daily use about a joint Occasional use in last month No Smoke    Cocaine/Crack No use        Meth/Amphetamines   No use        Heroin   No use        Other Opiates/Synthetics   No use        Inhalants  No use        Benzodiazepines   20  No Rx or illicit use, taken as given in hospital      Hallucinogens   No use        Barbiturates/Sedatives/Hypnotics   No use        Over-the-Counter Drugs   No use        Other   No use        Nicotine   14  vapes daily  Vape     Withdrawal symptoms: Have you had any of the following withdrawal symptoms?  Shaky / Jittery / Tremors  Nausea / Vomiting  Seizures  Anxiety / Worried    Have you experienced any cravings?  Yes    Have you had periods of abstinence?  Yes  What was your longest period? Pt reports he stopped drinking for one week after his first seizure.    Any circumstances that lead to relapse?  Pt reports he couldn't help it, he lost control over saying no.    What activities have you engaged in when using alcohol/other drugs that could be hazardous to you or others? (i.e. driving a car/motorcycle/boat, operating machinery, unsafe sex, sharing needles for drugs or  tattoos, etc ) The patient denied engaging in any of the above dangerous activities when using alcohol and/or drugs.    A description of any risk-taking behavior, including behavior that puts the client at risk of exposure to blood-borne or sexually transmitted diseases: NA    Arrests and legal interventions related to substance use: Pt reports no legal issues.     A description of how the patient's use affected their ability to function appropriately in a work setting: Pt reports no issues with employment    A description of how the patient's use affected their ability to function appropriately in an educational setting: Pt reports no issues when in school.     Leisure time activities that are associated with substance use:  Pt reports it's harder with different schedules to see friends so he has nothing else to do so he'll drink.    Do you think your substance use has become a problem for you? He agrees he has a substance abuse problem.    MEDICAL HISTORY  Physical or medical concerns or diagnoses: Per H&P  Acute alcohol withdrawal  Alcohol withdrawal seizure  Alcoholic ketoacidosis  Acute kidney injury   Alcoholic ketoacidosis  No past medical history on file.      Do you have any current medical treatment needs not being addressed by inpatient treatment?  Pt is currently hospitalized.     Do you need a referral for a medical provider?  Pt reports he has a medical provider.     Current medications: Per EHR    Current Facility-Administered Medications   Medication     acetaminophen (TYLENOL) tablet 650 mg     cloNIDine (CATAPRES) tablet 0.1 mg     dextrose 5% in lactated ringers + KCl 20 mEq/L infusion     diazepam (VALIUM) tablet 10 mg    Or     diazepam (VALIUM) injection 5-10 mg     flumazenil (ROMAZICON) injection 0.2 mg     folic acid (FOLVITE) tablet 1 mg     [START ON 6/9/2021] gabapentin (NEURONTIN) capsule 100 mg     [START ON 6/7/2021] gabapentin (NEURONTIN) capsule 300 mg     [START ON 6/5/2021]  gabapentin (NEURONTIN) capsule 600 mg     gabapentin (NEURONTIN) capsule 900 mg     OLANZapine zydis (zyPREXA) ODT tab 5-10 mg    Or     haloperidol lactate (HALDOL) injection 2.5-5 mg     melatonin tablet 5 mg     multivitamin w/minerals (THERA-VIT-M) tablet 1 tablet     ondansetron (ZOFRAN-ODT) ODT tab 4 mg    Or     ondansetron (ZOFRAN) injection 4 mg     senna-docusate (SENOKOT-S/PERICOLACE) 8.6-50 MG per tablet 1 tablet    Or     senna-docusate (SENOKOT-S/PERICOLACE) 8.6-50 MG per tablet 2 tablet     thiamine (B-1) tablet 200 mg    Followed by     [START ON 6/3/2021] thiamine (B-1) tablet 100 mg    Followed by     [START ON 6/9/2021] thiamine (B-1) tablet 100 mg         Are you pregnant? NA, Male    Do you have any specific physical needs/accommodations? No    MENTAL HEALTH HISTORY:  Have you ever had  hospitalizations or treatment for mental health illness: No    Mental health history, including diagnosis and symptoms, and the effect on the client's ability to function:  Pt reports anxiety and depression.    Current mental health treatment including psychotropic medication needed to maintain stability: (Note: The assessment must utilize screening tools approved by the commissioner pursuant to section 245.4863 to identify whether the client screens positive for co-occurring disorders):    Pt reports he's been in therapy for about a year but does not take med's.     GAIN-SS Tool:    When was the last time that you had significant problems... 6/2/2021   with feeling very trapped, lonely, sad, blue, depressed or hopeless about the future? Past month   with sleep trouble, such as bad dreams, sleeping restlessly, or falling asleep during the day? Never   with feeling very anxious, nervous, tense, scared, panicked or like something bad was going to happen? Past month   with becoming very distressed & upset when something reminded you of the past? Past month   with thinking about ending your life or committing  suicide? Past month     When was the last time that you did the following things 2 or more times? 6/2/2021   Lied or conned to get things you wanted or to avoid having to do something? Past month   Had a hard time paying attention at school, work or home? Past month   Had a hard time listening to instructions at school, work or home? Never   Were a bully or threatened other people? Never   Started physical fights with other people? Never       Have you ever been verbally, emotionally, physically or sexually abused?   The patient denied having any history of being verbally, emotionally, physically or sexually abused.    Family history of substance use and misuse:  Pt reports his father's side has a lot of alcohol issues.     The patient's desire for family involvement in the treatment program: Pt reports most don't know but would be supportive.   Level of family support: Mother    Social network in relation to expected support for recovery: Pt reports no friends or sober support group attendance.     Are you currently in a significant relationship? Yes.  4B. How long? 1 month            Please describe your significant other's use of mood altering chemicals? Pt reports she smokes pot but doesn't drink.    Do you have any children (include living arrangements/custody/contact)?:  No children.     What is your current living situation? Pt reports he lives with his parents.    Are you employed/attending school? Pt works FT     SUMMARY:  Ability to understand written treatment materials: Yes  Ability to understand patient rules and patient rights: Yes  Does the patient recognize needs related to substance use and is willing to follow treatment recommendations: Yes  Does the patient have an opioid use disorder:  does not have a history of opiate use.    ASAM Dimension Scale Ratings:  Dimension 1: 0 Client displays full functioning with good ability to tolerate and cope with withdrawal discomfort. No signs or symptoms of  intoxication or withdrawal or resolving signs or symptoms.  Dimension 2: 1 Client tolerates and bessy with physical discomfort and is able to get the services that the client needs.  Dimension 3: 2 Client has difficulty with impulse control and lacks coping skills. Client has thoughts of suicide or harm to others without means; however, the thoughts may interfere with participation in some treatment activities. Client has difficulty functioning in significant life areas. Client has moderate symptoms of emotional, behavioral, or cognitive problems. Client is able to participate in most treatment activities.  Dimension 4: 2 Client displays verbal compliance, but lacks consistent behaviors; has low motivation for change; and is passively involved in treatment.  Dimension 5: 3 Client has poor recognition and understanding of relapse and recidivism issues and displays moderately high vulnerability for further substance use or mental health problems. Client has few coping skills and rarely applies coping skills.  Dimension 6: 2 Client is engaged in structured, meaningful activity, but peers, family, significant other, and living environment are unsupportive, or there is criminal justice involvement by the client or among the client's peers, significant others, or in the client's living environment.    Category of Substance Severity (ICD-10 Code / DSM 5 Code)     Alcohol Use Disorder Severe  (10.20) (303.90)   Cannabis Use Disorder The patient does not meet the criteria for a Cannabis use disorder.   Hallucinogen Use Disorder The patient does not meet the criteria for a Hallucinogen use disorder.   Inhalant Use Disorder The patient does not meet the criteria for an Inhalant use disorder.   Opioid Use Disorder The patient does not meet the criteria for an Opioid use disorder.   Sedative, Hypnotic, or Anxiolytic Use Disorder The patient does not meet the criteria for a Sedative/Hypnotic use disorder.   Stimulant Related  Disorder The patient does not meet the criteria for a Stimulant use disorder.   Tobacco Use Disorder The patient does not meet the criteria for a Tobacco use disorder.   Other (or unknown) Substance Use Disorder The patient does not meet the criteria for a Other (or unknown) Substance use disorder.     A problematic pattern of alcohol/drug use leading to clinically significant impairment or distress, as manifested by at least two of the following, occurring within a 12-month period:    1.) Alcohol/drug is often taken in larger amounts or over a longer period than was intended.  2.) There is a persistent desire or unsuccessful efforts to cut down or control alcohol/drug use  4.) Craving, or a strong desire or urge to use alcohol/drug  9.) Alcohol/drug use is continued despite knowledge of having a persistent or recurrent physical or psychological problem that is likely to have been caused or exacerbated by alcohol.  10.) Tolerance, as defined by either of the following: A need for markedly increased amounts of alcohol/drug to achieve intoxication or desired effect.  11.) Withdrawal, as manifested by either of the following: The characteristic withdrawal syndrome for alcohol/drug (refer to Criteria A and B of the criteria set for alcohol/drug withdrawal).    Specify if: In early remission:  After full criteria for alcohol/drug use disorder were previously met, none of the criteria for alcohol/drug use disorder have been met for at least 3 months but for less than 12 months (with the exception that Criterion A4,  Craving or a strong desire or urge to use alcohol/drug  may be met).     In sustained remission:   After full criteria for alcohol use disorder were previously met, non of the criteria for alcohol/drug use disorder have been met at any time during a period of 12 months or longer (with the exception that Criterion A4,  Craving or strong desire or urge to use alcohol/drug  may be met).     Specify if:   This  additional specifier is used if the individual is in an environment where access to alcohol is restricted.    Mild: Presence of 2-3 symptoms  Moderate: Presence of 4-5 symptoms  Severe: Presence of 6 or more symptoms    Collateral information: RASHI Collateral Info: Sufficient information is obtained from the patient to support diagnosis and recommendations. Contact with a collateral sources is not required.    Recommendations:     Met with pt for chemical dependency evaluation. Pt meets criteria for Alcohol Use Disorder. Pt reports he has never been to treatment and has not attended sober support group meetings. Pt reports he would be interested in outpatient chemical dependency treatment. Pt is recommended to:    1. Abstain from all non-prescribed mood-altering substances  2. Take all medications as prescribed  3. Enter and complete an outpatient  treatment program  4. Increase sober support, attend sober support meetings at least one time weekly.        5.   Follow all recommendations of your medical providers      Referrals:  [facility]I  Phone: [phone]  Fax: [phone]    [facility]  Phone: [phone]  Fax: [phone]      RASHI consult completed by: TIFFANIE Maynard  Phone Number: [phone]  E-mail Address: [email or web address] Mental Health and Addiction Services Evaluation Department  [address] [place] [address]    *Due to regulation of Title 42 of the Code of Federal Regulations (CFR) Part 2: Confidentiality laws apply to this note and the information wherein.  Thus, this note cannot be copy and pasted into any other health care staff's note nor can it be included in general medical records sent to ANY outside agency without the patient's written consent.

## 2021-06-02 NOTE — PLAN OF CARE
Summary: Alcohol withdrawal and seizure, alcoholic ketoacidosis.  DATE & TIME: 6/1/21 2000-2330  Cognitive Concerns/ Orientation : A&Ox4   BEHAVIOR & AGGRESSION TOOL COLOR: Green  CIWA SCORE: 6 for tremors, anxious, sweats   ABNL VS/O2: HTN, tachycardic at times, other VVS on room air  MOBILITY: SBA  PAIN MANAGMENT: Denies  DIET: Regular  BOWEL/BLADDER: Continent, urinal at bedside.   ABNL LAB/BG: Ketone 1.7, Cr 1.38, urine positive for benzos and cannabinoids   DRAIN/DEVICES: PIV infusing D5 1/2 NS at 125  TELEMETRY RHYTHM: NSR  SKIN: intact  TESTS/PROCEDURES: none  D/C DAY/GOALS/PLACE: Pending, expected 2+ days.  OTHER IMPORTANT INFO: Seizure precautions. Chem dep and Psych consulted.

## 2021-06-02 NOTE — CONSULTS
6/2/2021      Pt has been interviewed via [employer] and CD Consult has been completed.     Recommendations:     Met with pt for chemical dependency evaluation. Pt meets criteria for Alcohol Use Disorder. Pt reports he has never been to treatment and has not attended sober support group meetings. Pt reports he would be interested in outpatient chemical dependency treatment. Pt is recommended to:    1. Abstain from all non-prescribed mood-altering substances  2. Take all medications as prescribed  3. Enter and complete an outpatient  treatment program  4. Increase sober support, attend sober support meetings at least one time weekly.        5.   Follow all recommendations of your medical providers      Referrals:  [facility]  Phone: [phone]  Fax: [phone]    [employer] [facility]  Phone: [phone]  Fax: [phone]      RASHI consult completed by: TIFFANIE Maynard  Phone Number: [phone]  E-mail Address: [email or web address] Mental Health and Addiction Services Evaluation Department  [address] [place] [address]

## 2021-06-02 NOTE — CONSULTS
"Consult Date: 06/02/2021    REFERRING PHYSICIAN:  Dr. Fuentes    REASON FOR CONSULTATION:  Alcohol withdrawal.    IDENTIFYING DATA:  The patient is a 20-year-old  male brought in with what appears to be a witnessed seizure.  He is currently convalescing on station 66.    CHIEF COMPLAINT:  \"I guess I would be willing to talk to someone.\"    HISTORY OF PRESENT ILLNESS:  The patient is a 20-year-old  male with a recent admission to [facility] with a suspected alcohol withdrawal seizure.  He apparently had a tonic-clonic seizure at work today.  The night before he was feeling dizzy and lightheaded.  He installs irrigation systems.  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  He apparently resumed drinking after a recent hospitalization earlier in May over at [facility].  He went through severe alcohol withdrawal there and had a seizure.  It does not appear that he was referred to treatment, but it sounds like they may have put him on naltrexone.    On interview this morning, the patient is fatigued, he is oriented to the hospital.  He denies previous psychiatric history.  There is mention of a possible essential tremor in his medical history.  He endorses daily consumption of alcohol.  He has never had a DWI, never completed formal treatment.  He denies other substance use.  His drug screen does, however, show cannabinoids and was positive for benzodiazepines.  It is certainly possible this was related to use of benzodiazepines to manage his seizure.  He does not endorse active neurovegetative symptoms of depression, anxiety, panic, OCD, eating disorder history, trauma history, ADHD, john or psychosis.  He mentions some depression history in his family.    PAST PSYCHIATRIC HISTORY:  Unremarkable.    PAST CHEMICAL DEPENDENCY HISTORY:  Notable for an alcohol use disorder, this is a second hospitalization in less than 30 days with severe alcohol withdrawal and a witnessed seizure.  It appears that he may " "use some cannabis.    PAST MEDICAL HISTORY:  Alcohol withdrawal, alcohol-induced withdrawal seizure, alcoholic ketoacidosis.    PRIOR TO ADMISSION MEDICATIONS:  None.    ALLERGIES:  NONE.     FAMILY HISTORY:  Notable for depression.    SOCIAL HISTORY:  The patient is single, lives in [place] and has a brother.  He has never been , does not have children and works in irrigation.  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  He denies legal history,  history or trauma history.    REVIEW OF SYSTEMS:  Notable for some tremor on neurologic exam otherwise negative.    PHYSICAL EXAMINATION:    VITAL SIGNS:  Blood pressure 123/83, temperature 98.6, pulse 74, respiratory rate 20, oxygen saturation 98%.  MENTAL STATUS EXAM:    Appearance:  The patient is disheveled young man is lying in bed, initially quite somnolent, but he did awaken to talk with me.  He is judged to be a fair historian.  Speech is of normal flow and tone.  Use of language appropriate.  Motor exam was tremulous.  Coordination, station, gait not tested.  Muscle strength and tone adequate.  Affect is generally pleasant.  Mood \"okay.\"  Thought process logical, coherent and goal directed.  No loosening of associations, flight of ideas or formal thought disorder.  Thought content negative for hallucinations, delusions, paranoia, suicidal or homicidal ideation.  Insight and judgment poor with respect to chemical use.    COGNITIVE EXAM:  The patient is fatigued, oriented to person.  He knows he is in the hospital and knows the day of the week.  He has little recollection for why he got hospitalized, but was aware upon prompting that he had a seizure.  Remote memory grossly intact.  General fund of knowledge average.    IMPRESSION:  The patient is a 20-year-old gentleman presenting with an alcohol withdrawal seizure.  I believe that he has insurance that would allow appropriate chemical " dependency assessment and recommendations.  He would probably benefit from an inpatient option given the severity of his symptoms and 2 subsequent hospitalizations.    DIAGNOSES:    1.  Alcohol use disorder, severe.  2.  Alcohol withdrawal seizure.  3.  Alcohol withdrawal with alcoholic ketoacidosis.    PLAN:    1.  Medical management as you are.  2.  Would offer CD assessment in the next 24 hours when his mental status is fully cleared to discuss disposition to treatment.  Currently, he is voluntary and cooperative with this recommendation.  3.  If the patient was to demand to leave in the next 24 hours in the midst of withdrawal, he would be holdable given the potential risks to his health.    Brodie Grimm MD        D: 2021   T: 2021   MT: [id number]    Name:     LAKESHIA PAK  MRN:      [id number]        Account:      [id number]   :      2000           Consult Date: 2021     Document: [id number]    cc:  Miguelito Fuentes MD

## 2021-06-02 NOTE — PROGRESS NOTES
Admission    Patient arrives to room 622-1 via cart from ED.  Care plan note: Alcohol withdrawal and seizures, alcoholic ketoacidosis.     Inpatient nursing criteria listed below were met:    PCD's Documented: Yes  Skin issues/needs documented :NA  Isolation education started/completed NA  Patient allergies verified with patient: Yes  Verified completion of [place] Risk Assessment Tool:  Yes  Verified completion of Guardianship screening tool: Yes  Fall Prevention: Care plan updated, Education given and documented Yes  Care Plan initiated: Yes  Home medications documented in belongings flowsheet: NA  Patient belongings documented in belongings flowsheet: Yes  Reminder note (belongings/ medications) placed in discharge instructions:No  Admission profile/ required documentation complete: Yes  Bedside Report Letter given and explained to patient Yes  Visitor Designated? Yes  If patient is a 72 hour hold/Commitment are belongings removed from room and locked up? NA

## 2021-06-02 NOTE — PLAN OF CARE
Summary: Alcohol withdrawal and seizure, alcoholic ketoacidosis.  DATE & TIME: 6/2/2021 0497-6281  Cognitive Concerns/ Orientation : A&Ox4. Calm and cooperative.  BEHAVIOR & AGGRESSION TOOL COLOR: Green  CIWA SCORE: 6, 4   ABNL VS/O2: HTN, tachycardic at times, other VSS on room air  MOBILITY: SBA  PAIN MANAGMENT: Denies  DIET: Regular  BOWEL/BLADDER: Continent, urinal at bedside.   ABNL LAB/BG: Ketone 1.7, Cr 1.38, urine positive for benzos and cannabinoids   DRAIN/DEVICES: PIV infusing D5 1/2 NS at 125  TELEMETRY RHYTHM: NSR  SKIN: Intact  TESTS/PROCEDURES: None  D/C DAY/GOALS/PLACE: Pending, expected 2+ days.  OTHER IMPORTANT INFO: Seizure precautions. Chem dep and Psych consulted. On scheduled Gabapentin and Clonidine.

## 2021-06-02 NOTE — PLAN OF CARE
DATE & TIME: 6/2/2021 days     Cognitive Concerns/ Orientation :  alert and oriented forgetful of place    BEHAVIOR & AGGRESSION TOOL COLOR: green   CIWA SCORE:  8 11 7   ABNL VS/O2: VSS on RA   MOBILITY:  up with sba   PAIN MANAGMENT: denies   DIET:  Regular poor appetite   BOWEL/BLADDER:  continent   ABNL LAB/BG:  K 3.3 replaced recheck at 1515   DRAIN/DEVICES: IV   TELEMETRY RHYTHM: Sinus dysrhythmia   SKIN:  intact   TESTS/PROCEDURES seen by psych and CD and will have a 90 min call tomorrow at 1030.  D/C DATE:  maybe tomorrow depends upon CD placement   Discharge Barriers:  needs to have Inpatient CD  OTHER IMPORTANT INFO:  Pt was restless and anxious this am medicated twice with valium. Pt up with sba and does well. Had CD consult and call with Alisson.

## 2021-06-03 VITALS
RESPIRATION RATE: 18 BRPM | TEMPERATURE: 97.7 F | HEIGHT: 76 IN | HEART RATE: 77 BPM | SYSTOLIC BLOOD PRESSURE: 135 MMHG | BODY MASS INDEX: 24.7 KG/M2 | DIASTOLIC BLOOD PRESSURE: 93 MMHG | OXYGEN SATURATION: 100 % | WEIGHT: 202.8 LBS

## 2021-06-03 LAB
ANION GAP SERPL CALCULATED.3IONS-SCNC: 3 MMOL/L (ref 3–14)
BUN SERPL-MCNC: 8 MG/DL (ref 7–30)
CALCIUM SERPL-MCNC: 8.7 MG/DL (ref 8.5–10.1)
CHLORIDE SERPL-SCNC: 110 MMOL/L (ref 94–109)
CO2 SERPL-SCNC: 27 MMOL/L (ref 20–32)
CREAT SERPL-MCNC: 0.81 MG/DL (ref 0.66–1.25)
GFR SERPL CREATININE-BSD FRML MDRD: >90 ML/MIN/{1.73_M2}
GLUCOSE SERPL-MCNC: 86 MG/DL (ref 70–99)
MAGNESIUM SERPL-MCNC: 1.9 MG/DL (ref 1.6–2.3)
POTASSIUM SERPL-SCNC: 4 MMOL/L (ref 3.4–5.3)
SODIUM SERPL-SCNC: 140 MMOL/L (ref 133–144)

## 2021-06-03 PROCEDURE — 99238 HOSP IP/OBS DSCHRG MGMT 30/<: CPT | Performed by: INTERNAL MEDICINE

## 2021-06-03 PROCEDURE — 250N000013 HC RX MED GY IP 250 OP 250 PS 637: Performed by: INTERNAL MEDICINE

## 2021-06-03 PROCEDURE — 258N000001 HC RX 258: Performed by: INTERNAL MEDICINE

## 2021-06-03 PROCEDURE — 250N000013 HC RX MED GY IP 250 OP 250 PS 637: Performed by: HOSPITALIST

## 2021-06-03 PROCEDURE — 80048 BASIC METABOLIC PNL TOTAL CA: CPT | Performed by: INTERNAL MEDICINE

## 2021-06-03 PROCEDURE — 36415 COLL VENOUS BLD VENIPUNCTURE: CPT | Performed by: INTERNAL MEDICINE

## 2021-06-03 PROCEDURE — 83735 ASSAY OF MAGNESIUM: CPT | Performed by: INTERNAL MEDICINE

## 2021-06-03 RX ORDER — LANOLIN ALCOHOL/MO/W.PET/CERES
100 CREAM (GRAM) TOPICAL DAILY
Qty: 10 TABLET | Refills: 0 | Status: SHIPPED | OUTPATIENT
Start: 2021-06-09 | End: 2021-06-19

## 2021-06-03 RX ORDER — MULTIPLE VITAMINS W/ MINERALS TAB 9MG-400MCG
1 TAB ORAL DAILY
Qty: 60 TABLET | Refills: 0 | Status: SHIPPED | OUTPATIENT
Start: 2021-06-04

## 2021-06-03 RX ORDER — GABAPENTIN 300 MG/1
300 CAPSULE ORAL 3 TIMES DAILY
Qty: 6 CAPSULE | Refills: 0 | Status: SHIPPED | OUTPATIENT
Start: 2021-06-03 | End: 2021-06-05

## 2021-06-03 RX ORDER — FOLIC ACID 1 MG/1
1 TABLET ORAL DAILY
Qty: 30 TABLET | Refills: 0 | Status: SHIPPED | OUTPATIENT
Start: 2021-06-04

## 2021-06-03 RX ADMIN — THIAMINE HCL TAB 100 MG 200 MG: 100 TAB at 08:31

## 2021-06-03 RX ADMIN — NICOTINE POLACRILEX 2 MG: 2 GUM, CHEWING ORAL at 06:39

## 2021-06-03 RX ADMIN — FOLIC ACID 1 MG: 1 TABLET ORAL at 08:31

## 2021-06-03 RX ADMIN — NICOTINE POLACRILEX 2 MG: 2 GUM, CHEWING ORAL at 14:42

## 2021-06-03 RX ADMIN — MULTIPLE VITAMINS W/ MINERALS TAB 1 TABLET: TAB at 08:31

## 2021-06-03 RX ADMIN — NICOTINE POLACRILEX 2 MG: 2 GUM, CHEWING ORAL at 02:53

## 2021-06-03 RX ADMIN — GABAPENTIN 900 MG: 300 CAPSULE ORAL at 06:34

## 2021-06-03 RX ADMIN — DEXTROSE MONOHYDRATE, SODIUM CHLORIDE, SODIUM LACTATE, POTASSIUM CHLORIDE, CALCIUM CHLORIDE: 5; 600; 310; 179; 20 INJECTION, SOLUTION INTRAVENOUS at 06:36

## 2021-06-03 RX ADMIN — NICOTINE POLACRILEX 2 MG: 2 GUM, CHEWING ORAL at 11:04

## 2021-06-03 RX ADMIN — GABAPENTIN 900 MG: 300 CAPSULE ORAL at 14:08

## 2021-06-03 RX ADMIN — CLONIDINE HYDROCHLORIDE 0.1 MG: 0.1 TABLET ORAL at 06:35

## 2021-06-03 ASSESSMENT — ACTIVITIES OF DAILY LIVING (ADL)
ADLS_ACUITY_SCORE: 16
ADLS_ACUITY_SCORE: 16
ADLS_ACUITY_SCORE: 17
ADLS_ACUITY_SCORE: 17

## 2021-06-03 ASSESSMENT — MIFFLIN-ST. JEOR: SCORE: 2021.39

## 2021-06-03 NOTE — PLAN OF CARE
Discharge    Patient discharged to home via private car with Mother.  Care plan note:  CIWA scores 5,5 for tremor.  Up ad marino; good intake.  Pleasant and cooperative.  Very agreeable to outpatient treatment which he is arranging and spoke on phone with this morning.      Listed belongings gathered and returned to patient. Yes  Belongings returned to patient from security/pharmacy  n/a  Care Plan and Patient education resolved: Yes  Prescriptions if needed, hard copies sent with patient  yes, filled and given to patient at discharge.  Home and hospital acquired medications returned to patient: NA  Medication Bin checked and emptied on discharge Yes  Follow up appointment made for patient: Yes for PCP and Neurology

## 2021-06-03 NOTE — PLAN OF CARE
"Summary: Alcohol withdrawal and seizure, alcoholic ketoacidosis.  DATE & TIME: 6/2/2021 4375-8089  Cognitive Concerns/ Orientation Alert and oriented forgetful about place  BEHAVIOR & AGGRESSION TOOL COLOR: Green  CIWA SCORE: 4, 4- tremors in BUE  ABNL VS/O2: VSS on RA  MOBILITY: SBA  PAIN MANAGMENT: Denies  DIET: Regular  BOWEL/BLADDER: Continent, urinal at bedside.   ABNL LAB/BG: K rechecked- 4.2  DRAIN/DEVICES: PIV infusing LR w/ K+ at 100mL/hr   TELEMETRY RHYTHM: sinus dysrhythmia  SKIN: Intact  TESTS/PROCEDURES: None  D/C DAY/GOALS/PLACE: Pending, expected 2+ days.  OTHER IMPORTANT INFO: Seizure precautions. Seen by psych and CD will have a call in am at 1030 for CD placement. Pt had vape pen in his pocket, nurse took it to put in a locker. Pt said \"just throw it away, it's almost out anyway.\" Nurse disposed of vape pen.    "

## 2021-06-03 NOTE — DISCHARGE INSTRUCTIONS
Follow up with your primary physician, Dr. Ramiro Grewal  [facility]  Tuesday June 8th at 10:50 am.

## 2021-06-03 NOTE — DISCHARGE SUMMARY
[facility]    Discharge Summary  Hospitalist    Date of Admission:  6/1/2021  Date of Discharge:  6/3/2021  Discharging Provider: Moe Escobedo MD    Discharge Diagnoses   etoh withdrawal with associated seizure.       History of Present Illness   Chandrakant Barone is a 20 year old male with history of  recent seizure suspected due to alcohol withdrawal who presents with another episode of seizure. The patient reportedly had another episode of generalized tonic-clonic seizure at work today.  Patient mentions that right before he had the seizure he felt a little lightheaded and fuzzy.  Denies any headache.  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 His coworkers told EMS that they witnessed 5 minutes of seizure-like activity followed by a five minute post ictal period.   His last drink was 2 days ago, apparently he had 3 drinks that day.  He had one beer yesterday afternoon to minimize tremors, as he did not want to be shaking in front of his parents. His reports that his last episode of vomiting was three days ago.  Patient mentioned that couple of days after discharge from last hospitalization he stayed sober however started drinking back again and has been drinking heavily this past week until Saturday.  The patient was hospitalized at [facility] between 5/10/2021-5/15/2021 for severe alcohol withdrawal and seizure thought to be due to alcohol withdrawal.  He had a CT head at that time that was unremarkable.  He required Precedex drip in the ICU for severe alcohol withdrawal at that time.  He was discharged on naltrexone with plan for outpatient addiction medicine follow-up.  He mentions that he has been taking his naltrexone consistently.  As far as other pertinent review of system is concerned, patient denies chest pain or dyspnea.  His appetite has been poor and he has not been eating regular food.  Denies abdominal pain.  Denies dysuria urinary urgency or frequency.  No  hematochezia or melena or bleeding from any source.  In the emergency room the patient was seen by Dr. Aponte, he was found to be in acute alcohol withdrawal, acute kidney injury with alcoholic ketoacidosis.  He was given lorazepam, started on IV fluids.             Hospital Course   Chandrakant Barone was admitted on 6/1/2021.  The following problems were addressed during his hospitalization:    Active Problems:    Alcoholic ketoacidosis    Alcohol withdrawal, uncomplicated (H)    Alcohol withdrawal seizure (H)  Chandrakant Barone is a 20 year old male who presents with seizure presumed due to alcohol withdrawal.     Acute alcohol withdrawal  Alcohol withdrawal seizure  Alcoholic ketoacidosis  Alcohol use disorder- severe     -Recent hospitalization at [facility] between 5/10/2021-5/15/2021 where he was admitted with severe alcohol withdrawal requiring Precedex drip and seizure thought to be due to withdrawal seizures.  -no hx of treatment. No hx of attending sober support group meetings.   -Was started on naltrexone at that time with plan for outpatient addiction medicine follow-up.  -Presents with another seizure at work, apparently started drinking after discharge  -CIEW 7-11 today. Tremor, anxiety, orientation  -currently feeling better. No new issues. Less tremulous. Taking po  Lives with mother who is at bedside.   -plans on outpatient chem dep- has call with Brennan tomorrow  - hx of 4 TBIs in highschool with no neurologist eval  -pt maintained on scheduled clonidine and tapering dose of neurontin per protocol.  -pt followed on CIWA protocol and no need for benzo for >24 hours  CIWA ~5 or less at time of discharge. Very subtle tremor on exam. Nl sbp. Not tachy. Nl neuro exam  Pt met with [facility] on phone and will be scheduled for outpatint chem dep  Pt takes naltrexone at home- prescribed by [facility]  Pt will cont naltrexone. He does have cravings. Follow up with pcp on this  med  Course of folate and thiamine.   MV every day  2 day course of neurontin  No driving for now until seen by pcp next week  Doubt needs long term driving restriction as seizure secondary to discrete metabolic situation/disorder and not epilepsy.    - follow up with chem Dep tx with Stephanie   Pt ambulating and taking po well.      Acute kidney injury -resolved.   Alcoholic ketoacidosis  -Presents with creatinine of 1.38, likely dehydrated from decreased p.o. intake  -resolved with po intake and IVFs.   -Cr 0.81 day of discharge with nl lytes.        Hx of TBI  4 TBIs in highschool related to sports  -placed referral to [facility] of neurology at discharge  pcp follow up [facility] system next week. Discussed with pt and his mother.      Tobacco use  Nicotine patch  Follow up with pcp     Hypokalemia  Replace per protocol. Check Mg level  Nl bmp day of discharge.        Chandrakant is a LOW SUSPICION PUI. covid negative.         DVT Prophylaxis: Pneumatic Compression Devices  Code Status: Full Code     Disposition: discharge home. Pt lives with mother.     Follow up pcp next week and [facility] neurology    Moe Escobedo MD, MD    Significant Results and Procedures   See hospital course    Pending Results   none  Unresulted Labs Ordered in the Past 30 Days of this Admission     No orders found from 5/2/2021 to 6/2/2021.          Code Status   Full Code       Primary Care Physician   Ramiro Grewal    Physical Exam   Temp: 97.7  F (36.5  C) Temp src: Oral BP: (!) 135/93 Pulse: 77   Resp: 18 SpO2: 100 % O2 Device: None (Room air)    Vitals:    06/02/21 0728 06/03/21 0623   Weight: 90.7 kg (200 lb) 92 kg (202 lb 12.8 oz)     Vital Signs with Ranges  Temp:  [97.5  F (36.4  C)-97.7  F (36.5  C)] 97.7  F (36.5  C)  Pulse:  [65-77] 77  Resp:  [18] 18  BP: (123-136)/(77-93) 135/93  SpO2:  [98 %-100 %] 100 %  I/O last 3 completed shifts:  In: 2652 [P.O.:245; I.V.:2407]  Out: -     Constitutional: nad, up in bed,   Eyes: nl  sclera  Respiratory: CTAB  Cardiovascular: RRR no r/g/m  GI: soft, nt, nd  Skin: no rash or diaphoresis  Musculoskeletal: no focal jt swelling or redness  Neurologic: nl speech and mentation. Very mild tremor. No diaphoresis. Alert, conversant, oriented and appropriate.   Neuropsychiatric: nl affect, pleasant, cooperative.     Discharge Disposition   Discharged to home  Condition at discharge: Good    Consultations This Hospital Stay   CHEMICAL DEPENDENCY IP CONSULT  PSYCHIATRY IP CONSULT    Time Spent on this Encounter   I, oMe Escobedo MD, personally saw the patient today and spent less than or equal to 30 minutes discharging this patient.    Discharge Orders      Neurology Adult Referral      Follow-up and recommended labs and tests     Appointment with  [facility] 6/21 at 11:15 am  [address]  [address]  [facility] [address] #[phone]     Reason for your hospital stay    Alcohol withdrawal, associated seizure     Follow-up and recommended labs and tests     1. Follow up with primary care doctor next week. Address medications, when you can resume driving, naltrexone  2. Follow up with [facility] regarding prior TBIs. Possible need for neuropsych testing.     Activity    Your activity upon discharge: activity as tolerated     Discharge Instructions    1. Abstain from all alcohol  2. No driving at this time given seizure. Discuss when to resume driving with primary care provider. No driving for now. You had a discrete metabolic (alcohol withdrawal) cause or your seizure so no likely need driving restriction for prolonged period.  3. Complete course of Neurontin/Gabapentin for alcohol withdrawal     Full Code     Diet    Follow this diet upon discharge: Orders Placed This Encounter      Combination Diet Regular Diet Adult     Discharge Medications   Current Discharge Medication List      START taking these medications    Details   gabapentin (NEURONTIN)  300 MG capsule Take 1 capsule (300 mg) by mouth 3 times daily for 2 days  Qty: 6 capsule, Refills: 0    Comments: Future refills by PCP Dr. Ramiro Grewal with phone number [phone].  Associated Diagnoses: Alcohol withdrawal, uncomplicated (H)      multivitamin w/minerals (THERA-VIT-M) tablet Take 1 tablet by mouth daily  Qty: 60 tablet, Refills: 0    Comments: Future refills by PCP Dr. Ramiro Grewal with phone number [phone].  Associated Diagnoses: Alcohol withdrawal, uncomplicated (H)      thiamine (B-1) 100 MG tablet Take 1 tablet (100 mg) by mouth daily for 10 days  Qty: 10 tablet, Refills: 0    Comments: Future refills by PCP Dr. Ramiro Grewal with phone number [phone].  Associated Diagnoses: Alcohol withdrawal, uncomplicated (H)         CONTINUE these medications which have CHANGED    Details   folic acid (FOLVITE) 1 MG tablet Take 1 tablet (1 mg) by mouth daily  Qty: 30 tablet, Refills: 0    Comments: Future refills by PCP Dr. Ramiro Grewal with phone number [phone].  Associated Diagnoses: Alcohol withdrawal, uncomplicated (H)         CONTINUE these medications which have NOT CHANGED    Details   melatonin 5 MG tablet Take 1 tablet (5 mg) by mouth every evening  Qty: 30 tablet, Refills: 0    Comments: Future refills by PCP Dr. Ramiro Grewal with phone number [phone].  Associated Diagnoses: Alcohol withdrawal, uncomplicated (H)      naltrexone (DEPADE/REVIA) 50 MG tablet Take 50 mg by mouth daily           Allergies   Allergies   Allergen Reactions     Grass Hives and Itching     Data   Most Recent 3 CBC's:  Recent Labs   Lab Test 06/02/21  0806 06/01/21  1152   WBC 5.5 8.8   HGB 13.9 16.1   MCV 97 95    261      Most Recent 3 BMP's:  Recent Labs   Lab Test 06/03/21  0750 06/02/21  1452 06/02/21  0806 06/01/21  1152 06/01/21  1152     --  137  --  135   POTASSIUM 4.0 4.2 3.3*   < > 3.9   CHLORIDE 110*  --  107  --  101   CO2 27  --  23  --  16*    BUN 8  --  8  --  17   CR 0.81  --  0.85  --  1.38*   ANIONGAP 3  --  7  --  18*   JOSE 8.7  --  8.5  --  9.8   GLC 86  --  88  --  134*    < > = values in this interval not displayed.     Most Recent 2 LFT's:  Recent Labs   Lab Test 06/02/21  0806 06/01/21  1152   AST 32 42   ALT 37 49   ALKPHOS 69 88   BILITOTAL 1.0 1.4*     Most Recent INR's and Anticoagulation Dosing History:  Anticoagulation Dose History     There is no flowsheet data to display.        Most Recent 3 Troponin's:No lab results found.  Most Recent Cholesterol Panel:No lab results found.  Most Recent 6 Bacteria Isolates From Any Culture (See EPIC Reports for Culture Details):No lab results found.  Most Recent TSH, T4 and A1c Labs:No lab results found.  No results found for this or any previous visit.

## 2021-06-03 NOTE — PLAN OF CARE
Summary: Alcohol withdrawal and seizure, alcoholic ketoacidosis.  DATE & TIME: 6/03/21 2256-2966  Cognitive Concerns/ Orientation:  A&O x 4   BEHAVIOR & AGGRESSION TOOL COLOR: Green  CIWA SCORE: 5 and 4  ABNL VS/O2: VSS on RA  MOBILITY: SBA  PAIN MANAGMENT: Denies  DIET: Regular  BOWEL/BLADDER: Continent, urinal at bedside.   ABNL LAB/BG: None new  DRAIN/DEVICES: PIV infusing LR w/ K+ at 100mL/hr   TELEMETRY RHYTHM: NSR  SKIN: Intact  TESTS/PROCEDURES: None  D/C DAY/GOALS/PLACE: Pending, expected 2+ days.  OTHER IMPORTANT INFO: Seizure precautions. Psych and CD following. Will have a call at 1030 for CD placement. Nicotine patch on L arm.  Nicorette gum given x2.

## 2022-01-11 PROCEDURE — 99283 EMERGENCY DEPT VISIT LOW MDM: CPT | Performed by: EMERGENCY MEDICINE

## 2022-01-11 PROCEDURE — 99284 EMERGENCY DEPT VISIT MOD MDM: CPT | Performed by: EMERGENCY MEDICINE

## 2022-01-11 PROCEDURE — 82075 ASSAY OF BREATH ETHANOL: CPT | Performed by: EMERGENCY MEDICINE

## 2022-01-12 ENCOUNTER — HOSPITAL ENCOUNTER (EMERGENCY)
Facility: CLINIC | Age: 22
Discharge: HOME | End: 2022-01-12
Attending: EMERGENCY MEDICINE | Admitting: EMERGENCY MEDICINE
Payer: COMMERCIAL

## 2022-01-12 VITALS
TEMPERATURE: 97.1 F | SYSTOLIC BLOOD PRESSURE: 125 MMHG | DIASTOLIC BLOOD PRESSURE: 74 MMHG | RESPIRATION RATE: 22 BRPM | HEART RATE: 74 BPM | OXYGEN SATURATION: 91 %

## 2022-01-12 DIAGNOSIS — F10.220 ALCOHOL DEPENDENCE WITH UNCOMPLICATED INTOXICATION (H): ICD-10-CM

## 2022-01-12 LAB — ALCOHOL BREATH TEST: 0.19 (ref 0–0.01)

## 2022-01-12 PROCEDURE — 250N000011 HC RX IP 250 OP 636: Performed by: EMERGENCY MEDICINE

## 2022-01-12 PROCEDURE — 250N000013 HC RX MED GY IP 250 OP 250 PS 637: Performed by: EMERGENCY MEDICINE

## 2022-01-12 RX ORDER — PHENOBARBITAL 16.2 MG/1
97.2 TABLET ORAL ONCE
Status: COMPLETED | OUTPATIENT
Start: 2022-01-12 | End: 2022-01-12

## 2022-01-12 RX ORDER — PHENOBARBITAL 97.2 MG/1
97.2 TABLET ORAL 2 TIMES DAILY
Qty: 2 TABLET | Refills: 0 | Status: SHIPPED | OUTPATIENT
Start: 2022-01-12 | End: 2022-01-12

## 2022-01-12 RX ORDER — ONDANSETRON 4 MG/1
4 TABLET, ORALLY DISINTEGRATING ORAL EVERY 8 HOURS PRN
Qty: 15 TABLET | Refills: 0 | Status: SHIPPED | OUTPATIENT
Start: 2022-01-12 | End: 2022-01-12

## 2022-01-12 RX ORDER — ONDANSETRON 4 MG/1
4 TABLET, ORALLY DISINTEGRATING ORAL ONCE
Status: COMPLETED | OUTPATIENT
Start: 2022-01-12 | End: 2022-01-12

## 2022-01-12 RX ORDER — ONDANSETRON 4 MG/1
4 TABLET, ORALLY DISINTEGRATING ORAL EVERY 8 HOURS PRN
Qty: 15 TABLET | Refills: 0 | Status: SHIPPED | OUTPATIENT
Start: 2022-01-12

## 2022-01-12 RX ORDER — PHENOBARBITAL 97.2 MG/1
97.2 TABLET ORAL 2 TIMES DAILY
Qty: 2 TABLET | Refills: 0 | Status: SHIPPED | OUTPATIENT
Start: 2022-01-12

## 2022-01-12 RX ADMIN — ONDANSETRON 4 MG: 4 TABLET, ORALLY DISINTEGRATING ORAL at 01:35

## 2022-01-12 RX ADMIN — PHENOBARBITAL 97.2 MG: 16.2 TABLET ORAL at 02:06

## 2022-01-12 NOTE — ED TRIAGE NOTES
States he had his last drink at 1900 tonight. He states when he goes through withdrawal he has a seizure. Last seizure was on 12/26. He is trying to quit drinking. States he is going to [facility] tomorrow

## 2022-01-12 NOTE — ED NOTES
"Last drink 1900. Pt reports drinking \"unmeasurable\" amount of alcohol in a binge episode. S.O. reports pt has been drinking for last 6 days. The last binge episode was 17 days prior to that. Pt has hx of withdrawal seizures. Pt plans to go to [facility] tomorrow.   "

## 2022-01-12 NOTE — ED PROVIDER NOTES
"  History     Chief Complaint   Patient presents with     Alcohol Intoxication     History per patient and significant other, and review of EMR.    HPI  Chandrakant Barone is a 21 year old male with history of alcohol dependence and abuse and history of alcohol withdrawal seizures who presents to the emergency department with concern for having an alcohol withdrawal seizure due to planned abstinence from alcohol use beginning this evening because he is going to enter the [facility] tomorrow morning.  He reports that prior history of alcohol withdrawal seizures and last seizure was approximately 17 days ago, with alcohol abstinence.  He drinks anywhere from 1/2-3/4 of a 1 L bottle of gin daily with last alcohol consumption \"a couple of shots\" of gin this evening in ~ 7 PM.  He feels anxious but otherwise has no symptoms of alcohol withdrawal.  He reports he will feel nauseous and may vomit in the morning when he wakes up, but he has no current abdominal pain or signs or symptoms of GI bleeding.  His history is remarkable for ED evaluation at [place] for a fall with minor closed head injury 3 days ago with discharge Rx for a Phenobarb taper for withdrawal prophylaxis.  He had no complications from phenobarb use at that time.  No other acute complaints or concerns.    Allergies:  Allergies   Allergen Reactions     Grass Hives and Itching       Problem List:    Patient Active Problem List    Diagnosis Date Noted     Alcoholic ketoacidosis 06/01/2021     Priority: Medium     Alcohol withdrawal, uncomplicated (H) 06/01/2021     Priority: Medium     Alcohol withdrawal seizure (H) 06/01/2021     Priority: Medium        Past Medical History:    No past medical history on file.    Past Surgical History:    No past surgical history on file.    Family History:    No family history on file.    Social History:  Marital Status:  Single [1]  Social History     Tobacco Use     Smoking status: Not on file     Smokeless " tobacco: Not on file   Substance Use Topics     Alcohol use: Not on file     Drug use: Not on file        Medications:    ondansetron (ZOFRAN ODT) 4 MG ODT tab  PHENobarbital (LUMINAL) 97.2 MG tablet  folic acid (FOLVITE) 1 MG tablet  gabapentin (NEURONTIN) 300 MG capsule  melatonin 5 MG tablet  multivitamin w/minerals (THERA-VIT-M) tablet  naltrexone (DEPADE/REVIA) 50 MG tablet        Review of Systems  As mentioned above in the history present illness.  All other systems were reviewed and are negative.    Physical Exam   BP: 130/81  Pulse: 85  Temp: 97.1  F (36.2  C)  Resp: 18  SpO2: 95 %      Physical Exam  Vitals and nursing note reviewed.   Constitutional:       General: He is not in acute distress.     Appearance: Normal appearance. He is well-developed. He is not ill-appearing or diaphoretic.   HENT:      Head: Normocephalic and atraumatic.      Right Ear: External ear normal.      Left Ear: External ear normal.   Eyes:      General: No scleral icterus.     Extraocular Movements: Extraocular movements intact.      Comments: Conjunctiva injected bilaterally.   Neck:      Trachea: No tracheal deviation.   Cardiovascular:      Rate and Rhythm: Normal rate and regular rhythm.      Heart sounds: Normal heart sounds. No murmur heard.  No friction rub. No gallop.    Pulmonary:      Effort: Pulmonary effort is normal. No respiratory distress.      Breath sounds: Normal breath sounds.   Abdominal:      General: There is no distension.      Palpations: Abdomen is soft.      Tenderness: There is no abdominal tenderness.   Musculoskeletal:         General: No tenderness. Normal range of motion.      Cervical back: Normal range of motion and neck supple.      Right lower leg: No edema.      Left lower leg: No edema.   Skin:     General: Skin is warm and dry.      Coloration: Skin is not jaundiced or pale.      Findings: No erythema or rash.   Neurological:      General: No focal deficit present.      Mental Status: He is  "alert and oriented to person, place, and time.      Coordination: Coordination normal.   Psychiatric:         Mood and Affect: Mood normal.         Behavior: Behavior normal.         ED Course     Mental Health Risk Assessment      PSS-3    Date and Time Over the past 2 weeks have you felt down, depressed, or hopeless? Over the past 2 weeks have you had thoughts of killing yourself? Have you ever attempted to kill yourself? When did this last happen? User   01/11/22 6552 yes yes no -- JOSE                Item Assessment   Suicidal Ideation None, not currently suicidal.   Plan  no active plan   Intent  none present currently   Suicidal or self-harm behaviors None recently, not actively   Risk Factors Alcohol abuse   Protective Factors Here with supportive significant other to assist to monitor him.          Procedures              Results for orders placed or performed during the hospital encounter of 01/12/22 (from the past 24 hour(s))   Alcohol Breath Test   Result Value Ref Range    Alcohol Breath Test 0.192 (A) 0.00 - 0.01       Medications   ondansetron (ZOFRAN-ODT) ODT tab 4 mg (4 mg Oral Given 1/12/22 0135)   PHENobarbital (LUMINAL) tablet 97.2 mg (97.2 mg Oral Given 1/12/22 0206)     Declined laboratory evaluation reports that \"they will just do that at [facility] tomorrow anyway\".    I reviewed the Phenobarbital dosing for treatment of alcohol withdrawal with the pharmacist on duty.    Assessments & Plan (with Medical Decision Making)   21-year-old alcoholic who presents to the emergency department concerned he may develop alcohol withdrawal and an alcohol withdrawal seizure prior to his admission to the [facility] center later this morning.  He will be admitted within the next 12 hours.  No current evidence of alcohol withdrawal and breath EtOH 0.192 at ~1:15 AM prior to discharge with his significant other.  He is able to ambulate independently and does not appear to be a fall risk or imminent " danger to himself as he is with his significant other who is sober and able to assist and monitor him carefully. She will observe him for any signs of alcohol withdrawal or respiratory depression and ensure that he gets to [facility] later this morning as scheduled for admission there for CD treatment. He was given a dose of Phenobarbital 97.2 mg po and Zofran for nausea. I will prescribe a 2 tablets of Phenobarbital, and Zofran to use if needed in the a.m./today.  He and his significant other were provided instructions for supportive care and will return as needed for worsened condition or worsening symptoms, or new problems or concerns.    I have reviewed the nursing notes.    I have reviewed the findings, diagnosis, plan and need for follow up with the patient and his significant other.    Discharge Medication List as of 1/12/2022  2:02 AM          Final diagnoses:   Alcohol dependence with uncomplicated intoxication (H)       1/11/2022   [facility] EMERGENCY DEPT     Deshawn Kam MD  01/12/22 2008